# Patient Record
Sex: MALE | Race: WHITE | RURAL
[De-identification: names, ages, dates, MRNs, and addresses within clinical notes are randomized per-mention and may not be internally consistent; named-entity substitution may affect disease eponyms.]

---

## 2017-01-03 ENCOUNTER — CLINICAL SUPPORT (OUTPATIENT)
Dept: FAMILY MEDICINE CLINIC | Age: 82
End: 2017-01-03

## 2017-01-03 DIAGNOSIS — I48.20 CHRONIC ATRIAL FIBRILLATION (HCC): Primary | ICD-10-CM

## 2017-01-03 LAB
INR BLD: 3.5
PT POC: 42.4 SEC
VALID INTERNAL CONTROL?: YES

## 2017-01-05 ENCOUNTER — TELEPHONE (OUTPATIENT)
Dept: FAMILY MEDICINE CLINIC | Age: 82
End: 2017-01-05

## 2017-01-05 NOTE — TELEPHONE ENCOUNTER
Call pt, decreasing Coumadin to a therapeutic dose should not affect vision  If there are vision changes go to see an Ophthalmologist

## 2017-01-18 ENCOUNTER — CLINICAL SUPPORT (OUTPATIENT)
Dept: FAMILY MEDICINE CLINIC | Age: 82
End: 2017-01-18

## 2017-01-18 ENCOUNTER — TELEPHONE (OUTPATIENT)
Dept: FAMILY MEDICINE CLINIC | Age: 82
End: 2017-01-18

## 2017-01-18 DIAGNOSIS — I48.91 ATRIAL FIBRILLATION, UNSPECIFIED TYPE (HCC): Primary | ICD-10-CM

## 2017-01-18 LAB
INR BLD: 3.2
PT POC: 38.3 SEC
VALID INTERNAL CONTROL?: YES

## 2017-01-18 NOTE — TELEPHONE ENCOUNTER
Per verbal order from Dr. Rohan Alicia:   Warfarin  Hold medication on Thursday  Take 5 mg, 5 mg then 7.5 mg then repeat in the same sequence and come back in 2 weeks for recheck. The above information was given to patient.   Patient to come back on Wednesday, February 1, 2017  Inocencia Recinos LPN

## 2017-02-01 ENCOUNTER — CLINICAL SUPPORT (OUTPATIENT)
Dept: FAMILY MEDICINE CLINIC | Age: 82
End: 2017-02-01

## 2017-02-01 DIAGNOSIS — I48.20 CHRONIC ATRIAL FIBRILLATION (HCC): Primary | ICD-10-CM

## 2017-02-01 LAB
INR BLD: 2.2
PT POC: 26.1 SEC
VALID INTERNAL CONTROL?: YES

## 2017-02-01 NOTE — PROGRESS NOTES
Patient in office for PT/INR only. See flow sheet. Patient advised of recommendations for dosage (5 mg for 2 day and 7.5 mg for one day) and RTC in 2 weeks for recheck.     Dexter Price RN

## 2017-02-02 RX ORDER — ATENOLOL 25 MG/1
TABLET ORAL
Qty: 90 TAB | Refills: 0 | OUTPATIENT
Start: 2017-02-02

## 2017-02-09 ENCOUNTER — OFFICE VISIT (OUTPATIENT)
Dept: FAMILY MEDICINE CLINIC | Age: 82
End: 2017-02-09

## 2017-02-09 VITALS
RESPIRATION RATE: 16 BRPM | BODY MASS INDEX: 26.08 KG/M2 | SYSTOLIC BLOOD PRESSURE: 118 MMHG | HEART RATE: 72 BPM | WEIGHT: 203.2 LBS | TEMPERATURE: 96.5 F | DIASTOLIC BLOOD PRESSURE: 60 MMHG | HEIGHT: 74 IN | OXYGEN SATURATION: 99 %

## 2017-02-09 DIAGNOSIS — Z23 ENCOUNTER FOR IMMUNIZATION: ICD-10-CM

## 2017-02-09 DIAGNOSIS — J44.9 CHRONIC OBSTRUCTIVE PULMONARY DISEASE, UNSPECIFIED COPD TYPE (HCC): ICD-10-CM

## 2017-02-09 DIAGNOSIS — Z00.00 MEDICARE ANNUAL WELLNESS VISIT, INITIAL: Primary | ICD-10-CM

## 2017-02-09 DIAGNOSIS — I48.20 CHRONIC ATRIAL FIBRILLATION (HCC): ICD-10-CM

## 2017-02-09 DIAGNOSIS — Z71.89 ADVANCED DIRECTIVES, COUNSELING/DISCUSSION: ICD-10-CM

## 2017-02-09 DIAGNOSIS — Z13.31 SCREENING FOR DEPRESSION: ICD-10-CM

## 2017-02-09 DIAGNOSIS — I10 ESSENTIAL HYPERTENSION: ICD-10-CM

## 2017-02-09 DIAGNOSIS — E78.00 HYPERCHOLESTEROLEMIA: ICD-10-CM

## 2017-02-09 RX ORDER — ATENOLOL 25 MG/1
TABLET ORAL
Qty: 90 TAB | Refills: 1 | Status: SHIPPED | OUTPATIENT
Start: 2017-02-09 | End: 2017-07-10 | Stop reason: SDUPTHER

## 2017-02-09 NOTE — PATIENT INSTRUCTIONS
Schedule of Personalized Health Plan  (Provide Copy to Patient)  The best way to stay healthy is to live a healthy lifestyle. A healthy lifestyle includes regular exercise, eating a well-balanced diet, keeping a healthy weight and not smoking. Regular physical exams and screening tests are another important way to take care of yourself. Preventive exams provided by health care providers can find health problems early when treatment works best and can keep you from getting certain diseases or illnesses. Preventive services include exams, lab tests, screenings, shots, monitoring and information to help you take care of your own health. All people over 65 should have a pneumonia shot. Pneumonia shots are usually only needed once in a lifetime unless your doctor decides differently. All people over 65 should have a yearly flu shot. People over 65 are at medium to high risk for Hepatitis B. Three shots are needed for complete protection. In addition to your physical exam, some screening tests are recommended:    Bone mass measurement (dexa scan) is recommended every two years if you have certain risk factors, such as personal history of vertebral fracture or chronic steroid medication use    Diabetes Mellitus screening is recommended every year. Glaucoma is an eye disease caused by high pressure in the eye. An eye exam is recommended every year. Cardiovascular screening tests that check your cholesterol and other blood fat (lipid) levels are recommended every five years. Colorectal Cancer screening tests help to find pre-cancerous polyps (growths in the colon) so they can be removed before they turn into cancer. Tests ordered for screening depend on your personal and family history risk factors.     Screening for Prostate Cancer is recommended yearly with a digital rectal exam and/or a PSA test    Here is a list of your current Health Maintenance items with a due date:  Health Maintenance Topic Date Due    DTaP/Tdap/Td series (1 - Tdap) 02/01/1956    ZOSTER VACCINE AGE 60>  02/01/1995    GLAUCOMA SCREENING Q2Y  02/01/2000    MEDICARE YEARLY EXAM  02/01/2000    Pneumococcal 65+ Low/Medium Risk (2 of 2 - PCV13) 10/05/2016    INFLUENZA AGE 9 TO ADULT  Completed

## 2017-02-09 NOTE — PROGRESS NOTES
Selwyn Morrison is a 80 y.o. male and presents for annual Medicare Wellness Visit. Problem List: Reviewed with patient and discussed risk factors. Patient Active Problem List   Diagnosis Code    Hypercholesterolemia E78.00    Hypertension I10    COPD (chronic obstructive pulmonary disease) ([facility] J44.9    Edema R60.9    Atrial fibrillation (HCC) I48.91    Chronic anticoagulation Z79.01    Advanced directives, counseling/discussion Z71.89       Current medical providers:  Patient Care Team:  Dl Radford MD as PCP - General (Family Practice)    PSH: Reviewed with patient  Past Surgical History   Procedure Laterality Date    Hx prostatectomy  2008        SH: Reviewed with patient  Social History   Substance Use Topics    Smoking status: Former Smoker     Types: Cigarettes     Quit date: 9/24/1990    Smokeless tobacco: Never Used    Alcohol use No       FH: Reviewed with patient  Family History   Problem Relation Age of Onset    Heart Disease Father     Alcohol abuse Sister        Medications/Allergies: Reviewed with patient  Current Outpatient Prescriptions on File Prior to Visit   Medication Sig Dispense Refill    atenolol (TENORMIN) 25 mg tablet TAKE 1 TABLET BY MOUTH EVERY DAY 90 Tab 0    warfarin (COUMADIN) 5 mg tablet take 1 1/2 tablets by mouth once daily or as directed 135 Tab 0    prednisoLONE sodium phosphate (INFLAMASE FORTE) 1 % ophthalmic solution   0    HYDROcodone-acetaminophen (NORCO) 5-325 mg per tablet Take 1 Tab by mouth every eight (8) hours as needed for Pain. Max Daily Amount: 3 Tabs. 15 Tab 0    furosemide (LASIX) 20 mg tablet Take 1 Tab by mouth daily. 90 Tab 3    OXYGEN-AIR DELIVERY SYSTEMS 2 L by Nasal route. 2 liters o2       No current facility-administered medications on file prior to visit.        Allergies   Allergen Reactions    Niaspan [Niacin] Other (comments)    Statins-Hmg-Coa Reductase Inhibitors Other (comments)     Body aches    Sulfa (Sulfonamide Antibiotics) Unknown (comments)       Objective: There were no vitals taken for this visit. There is no height or weight on file to calculate BMI. Assessment of cognitive impairment: Alert and oriented x 3    Depression Screen:   PHQ 2 / 9, over the last two weeks 2/9/2017   Little interest or pleasure in doing things Several days   Feeling down, depressed or hopeless -   Total Score PHQ 2 -       Fall Risk Assessment:    Fall Risk Assessment, last 12 mths 2/9/2017   Able to walk? Yes   Fall in past 12 months? No       Functional Ability:   Does the patient exhibit a steady gait?  no   How long did it take the patient to get up and walk from a sitting position? 3 seconds   Is the patient self reliant?  (ie can do own laundry, meals, household chores)  yes     Does the patient handle his/her own medications? yes     Does the patient handle his/her own money? yes     Is the patients home safe (ie good lighting, handrails on stairs and bath, etc.)? yes     Did you notice or did patient express any hearing difficulties? no     Did you notice or did patient express any vision difficulties? yes     Were distance and reading eye charts used? no       Advance Care Planning:   Patient was offered the opportunity to discuss advance care planning:  yes   Does patient have an Advance Directive:  yes   If no, did you provide information on Caring Connections? yes       Plan:      No orders of the defined types were placed in this encounter. Health Maintenance   Topic Date Due    DTaP/Tdap/Td series (1 - Tdap) 02/01/1956    ZOSTER VACCINE AGE 60>  02/01/1995    GLAUCOMA SCREENING Q2Y  02/01/2000    MEDICARE YEARLY EXAM  02/01/2000    Pneumococcal 65+ Low/Medium Risk (2 of 2 - PCV13) 10/05/2016    INFLUENZA AGE 9 TO ADULT  Completed       *Patient verbalized understanding and agreement with the plan.   A copy of the After Visit Summary with personalized health plan was given to the patient today.

## 2017-02-09 NOTE — PROGRESS NOTES
HISTORY OF PRESENT ILLNESS  Soy Velazco is a 80 y.o. male. Chief Complaint   Patient presents with    Medication Refill    Annual Wellness Visit            HPI  HTN  On Atenolol for Afib  And on fluid pill  Needs refill of Atenolol  No SE with meds    On Coumadin  Last INR good    Hyperlipidemia  Not fasting today  Not on meds    COPD  Has oxygen  Doing ok  Not smoking      Review of Systems   Constitutional: Positive for weight loss. Negative for chills and fever. Respiratory: Positive for shortness of breath. Negative for cough and wheezing. Cardiovascular: Positive for leg swelling. Negative for chest pain. Gastrointestinal: Negative for blood in stool. Genitourinary: Negative. Negative for hematuria. Past Medical History   Diagnosis Date    Atrial fibrillation (HCC)     Chronic airway obstruction, not elsewhere classified (HCC)     Chronic anticoagulation     Edema     Hypercholesterolemia     Hypertension     Prostate CA [facility])      Current Outpatient Prescriptions   Medication Sig Dispense Refill    atenolol (TENORMIN) 25 mg tablet TAKE 1 TABLET BY MOUTH EVERY DAY 90 Tab 1    warfarin (COUMADIN) 5 mg tablet take 1 1/2 tablets by mouth once daily or as directed 135 Tab 0    prednisoLONE sodium phosphate (INFLAMASE FORTE) 1 % ophthalmic solution   0    furosemide (LASIX) 20 mg tablet Take 1 Tab by mouth daily. 90 Tab 3    OXYGEN-AIR DELIVERY SYSTEMS 2 L by Nasal route. 2 liters o2      HYDROcodone-acetaminophen (NORCO) 5-325 mg per tablet Take 1 Tab by mouth every eight (8) hours as needed for Pain. Max Daily Amount: 3 Tabs.  15 Tab 0     Allergies   Allergen Reactions    Niaspan [Niacin] Other (comments)    Statins-Hmg-Coa Reductase Inhibitors Other (comments)     Body aches    Sulfa (Sulfonamide Antibiotics) Unknown (comments)     Visit Vitals    /60 (BP 1 Location: Right arm, BP Patient Position: Sitting)    Pulse 72    Temp 96.5 °F (35.8 °C) (Oral)    Resp 16    Ht 6' 2\" (1.88 m)    Wt 203 lb 3.2 oz (92.2 kg)    SpO2 99%    BMI 26.09 kg/m2       Physical Exam   Constitutional: He is oriented to person, place, and time. He appears well-developed and well-nourished. No distress. HENT:   Head: Normocephalic and atraumatic. Right Ear: External ear normal.   Left Ear: External ear normal.   Mouth/Throat: Oropharynx is clear and moist. No oropharyngeal exudate. Eyes: Conjunctivae and EOM are normal. Pupils are equal, round, and reactive to light. Cardiovascular: Normal rate, regular rhythm and normal heart sounds. Pulmonary/Chest: Effort normal. He has no wheezes. Decreased BS   Musculoskeletal: He exhibits no edema. Lymphadenopathy:     He has no cervical adenopathy. Neurological: He is alert and oriented to person, place, and time. Skin: Skin is warm and dry. Psychiatric: He has a normal mood and affect. Nursing note and vitals reviewed. ASSESSMENT and PLAN    ICD-10-CM ICD-9-CM    1. Medicare annual wellness visit, initial Z00.00 V70.0    2. Advanced directives, counseling/discussion Z71.89 V65.49    3. Screening for depression Z13.89 V79.0 NE DEPRESSION SCREEN ANNUAL   4. Encounter for immunization Z23 V03.89 PNEUMOCOCCAL CONJ VACCINE 13 VALENT IM   5. Chronic atrial fibrillation (HCC) I48.2 427.31    6. Essential hypertension I10 401.9 CBC WITH AUTOMATED DIFF      METABOLIC PANEL, COMPREHENSIVE      atenolol (TENORMIN) 25 mg tablet   7. Hypercholesterolemia E78.00 272.0 LIPID PANEL WITH LDL/HDL RATIO   8.  Chronic obstructive pulmonary disease, unspecified COPD type ([facility] J44.9 496

## 2017-02-23 ENCOUNTER — CLINICAL SUPPORT (OUTPATIENT)
Dept: FAMILY MEDICINE CLINIC | Age: 82
End: 2017-02-23

## 2017-02-23 DIAGNOSIS — I48.20 CHRONIC ATRIAL FIBRILLATION (HCC): Primary | ICD-10-CM

## 2017-02-23 LAB
INR BLD: 2.3
PT POC: 27.4 SEC
VALID INTERNAL CONTROL?: YES

## 2017-02-28 DIAGNOSIS — R60.9 EDEMA, UNSPECIFIED TYPE: ICD-10-CM

## 2017-02-28 RX ORDER — FUROSEMIDE 20 MG/1
20 TABLET ORAL DAILY
Qty: 90 TAB | Refills: 3 | OUTPATIENT
Start: 2017-02-28

## 2017-02-28 RX ORDER — WARFARIN SODIUM 5 MG/1
TABLET ORAL
Qty: 135 TAB | Refills: 0 | OUTPATIENT
Start: 2017-02-28

## 2017-03-06 RX ORDER — WARFARIN SODIUM 5 MG/1
TABLET ORAL
Qty: 135 TAB | Refills: 0 | Status: SHIPPED | OUTPATIENT
Start: 2017-03-06 | End: 2017-07-10 | Stop reason: SDUPTHER

## 2017-03-06 NOTE — TELEPHONE ENCOUNTER
Requested Prescriptions     Pending Prescriptions Disp Refills    warfarin (COUMADIN) 5 mg tablet 135 Tab 0     Sig: take 1 1/2 tablets by mouth once daily or as directed       Last filled:       11/4/16      #135 tab        0 refills

## 2017-03-06 NOTE — TELEPHONE ENCOUNTER
Called and spoke to patient, he advises that he has an appointment for Thursday to see Dr Tricia Calzada.   Boyd Sampson RN

## 2017-03-09 ENCOUNTER — OFFICE VISIT (OUTPATIENT)
Dept: FAMILY MEDICINE CLINIC | Age: 82
End: 2017-03-09

## 2017-03-09 VITALS
SYSTOLIC BLOOD PRESSURE: 122 MMHG | WEIGHT: 205 LBS | HEIGHT: 76 IN | OXYGEN SATURATION: 96 % | RESPIRATION RATE: 20 BRPM | BODY MASS INDEX: 24.96 KG/M2 | HEART RATE: 64 BPM | TEMPERATURE: 96.7 F | DIASTOLIC BLOOD PRESSURE: 70 MMHG

## 2017-03-09 DIAGNOSIS — I48.91 ATRIAL FIBRILLATION, UNSPECIFIED TYPE (HCC): Primary | ICD-10-CM

## 2017-03-09 DIAGNOSIS — D64.9 ANEMIA, UNSPECIFIED TYPE: ICD-10-CM

## 2017-03-09 DIAGNOSIS — E78.00 HYPERCHOLESTEROLEMIA: ICD-10-CM

## 2017-03-09 DIAGNOSIS — I10 ESSENTIAL HYPERTENSION: ICD-10-CM

## 2017-03-09 LAB
INR BLD: 2.4
PT POC: 29 SEC
VALID INTERNAL CONTROL?: YES

## 2017-03-09 NOTE — PROGRESS NOTES
Per written/verbal order from , patient is to continue current coumadin dose and recheck in 2 weeks. Patient notified by phone.

## 2017-03-09 NOTE — MR AVS SNAPSHOT
Visit Information Date & Time Provider Department Dept. Phone Encounter #  
 3/9/2017  9:40 AM Isabela Morales MD [address] [place] [address] [phone] [id number] Follow-up Instructions Return in about 6 months (around 9/9/2017). Follow-up and Disposition History Upcoming Health Maintenance Date Due DTaP/Tdap/Td series (1 - Tdap) 2/1/1956 ZOSTER VACCINE AGE 60> 2/1/1995 GLAUCOMA SCREENING Q2Y 2/1/2000 MEDICARE YEARLY EXAM 2/10/2018 Allergies as of 3/9/2017  Review Complete On: 3/9/2017 By: Orlando Mo LPN Severity Noted Reaction Type Reactions Niaspan [Niacin]  12/03/2013    Other (comments) Statins-hmg-coa Reductase Inhibitors  09/24/2013   Side Effect Other (comments) Body aches Sulfa (Sulfonamide Antibiotics)  09/24/2013    Unknown (comments) Current Immunizations  Reviewed on 2/9/2017 Name Date Influenza High Dose Vaccine PF 10/19/2016, 10/5/2015 Influenza Vaccine 9/24/2014 Pneumococcal Conjugate (PCV-13) 2/9/2017 Pneumococcal Polysaccharide (PPSV-23) 10/5/2015, 9/19/2014 Not reviewed this visit You Were Diagnosed With   
  
 Codes Comments Atrial fibrillation, unspecified type ([facility]    -  Primary ICD-10-CM: I48.91 
ICD-9-CM: 427.31 Hypercholesterolemia     ICD-10-CM: E78.00 ICD-9-CM: 272.0 Essential hypertension     ICD-10-CM: I10 
ICD-9-CM: 401.9 Anemia, unspecified type     ICD-10-CM: D64.9 ICD-9-CM: 534. 9 Vitals BP Pulse Temp Resp Height(growth percentile) Weight(growth percentile) 122/70 (BP 1 Location: Left arm, BP Patient Position: Sitting) 64 96.7 °F (35.9 °C) (Oral) 20 6' 4\" (1.93 m) 205 lb (93 kg) SpO2 BMI Smoking Status 96% 24.95 kg/m2 Former Smoker Vitals History BMI and BSA Data Body Mass Index Body Surface Area 24.95 kg/m 2 2.23 m 2 Preferred Pharmacy Pharmacy Name Phone [address] [place] [address] Your Updated Medication List  
  
   
This list is accurate as of: 3/9/17 11:23 AM.  Always use your most recent med list.  
  
  
  
  
 atenolol 25 mg tablet Commonly known as:  TENORMIN  
TAKE 1 TABLET BY MOUTH EVERY DAY  
  
 furosemide 20 mg tablet Commonly known as:  LASIX Take 1 Tab by mouth daily. OXYGEN-AIR DELIVERY SYSTEMS  
2 L by Nasal route. 2 liters o2  
  
 prednisoLONE sodium phosphate 1 % ophthalmic solution Commonly known as:  INFLAMASE FORTE  
  
 warfarin 5 mg tablet Commonly known as:  COUMADIN  
take 1 1/2 tablets by mouth once daily or as directed We Performed the Following AMB POC PT/INR [00901 CPT(R)] CBC WITH AUTOMATED DIFF [49699 CPT(R)] LIPID PANEL WITH LDL/HDL RATIO [59830 CPT(R)] METABOLIC PANEL, COMPREHENSIVE [58082 CPT(R)] Follow-up Instructions Return in about 6 months (around 9/9/2017). Please provide this summary of care documentation to your next provider. Your primary care clinician is listed as Rhiannon Pearson. If you have any questions after today's visit, please call [phone].

## 2017-03-09 NOTE — PROGRESS NOTES
HISTORY OF PRESENT ILLNESS  Lauren Teague is a 80 y.o. male. Chief Complaint   Patient presents with    Medication Refill     Warfarin    Coagulation disorder     Pt/Inr       HPI    Review of Systems   Constitutional: Negative for fever and malaise/fatigue. HENT: Negative for congestion. Eyes: Negative for blurred vision. Respiratory: Negative for cough. Cardiovascular: Negative for chest pain. Gastrointestinal: Negative for blood in stool and melena. Genitourinary: Positive for frequency. Negative for dysuria. Musculoskeletal: Positive for back pain. Endo/Heme/Allergies: Negative for polydipsia. Does not bruise/bleed easily. Past Medical History:   Diagnosis Date    Atrial fibrillation ([facility]     Chronic airway obstruction, not elsewhere classified (HCC)     Chronic anticoagulation     Edema     Hypercholesterolemia     Hypertension     Prostate [facility])      Current Outpatient Prescriptions   Medication Sig Dispense Refill    warfarin (COUMADIN) 5 mg tablet take 1 1/2 tablets by mouth once daily or as directed 135 Tab 0    atenolol (TENORMIN) 25 mg tablet TAKE 1 TABLET BY MOUTH EVERY DAY 90 Tab 1    prednisoLONE sodium phosphate (INFLAMASE FORTE) 1 % ophthalmic solution   0    furosemide (LASIX) 20 mg tablet Take 1 Tab by mouth daily. 90 Tab 3    OXYGEN-AIR DELIVERY SYSTEMS 2 L by Nasal route. 2 liters o2       Allergies   Allergen Reactions    Niaspan [Niacin] Other (comments)    Statins-Hmg-Coa Reductase Inhibitors Other (comments)     Body aches    Sulfa (Sulfonamide Antibiotics) Unknown (comments)     Visit Vitals    /70 (BP 1 Location: Left arm, BP Patient Position: Sitting)    Pulse 64    Temp 96.7 °F (35.9 °C) (Oral)    Resp 20    Ht 6' 4\" (1.93 m)    Wt 205 lb (93 kg)    SpO2 96%    BMI 24.95 kg/m2       Physical Exam   Constitutional: He is oriented to person, place, and time. He appears well-developed and well-nourished. No distress.    HENT: Head: Normocephalic and atraumatic. Eyes: Conjunctivae and EOM are normal.   Cardiovascular: Normal rate, regular rhythm and normal heart sounds. Pulmonary/Chest: Effort normal and breath sounds normal.   Musculoskeletal: He exhibits no edema. Neurological: He is alert and oriented to person, place, and time. Skin: Skin is warm and dry. Nursing note and vitals reviewed. Recent Results (from the past 12 hour(s))   AMB POC PT/INR    Collection Time: 03/09/17  9:59 AM   Result Value Ref Range    VALID INTERNAL CONTROL POC Yes     Prothrombin time (POC) 29.0 sec    INR POC 2.4        ASSESSMENT and PLAN    ICD-10-CM ICD-9-CM    1. Atrial fibrillation, unspecified type (HCC) I48.91 427.31 AMB POC PT/INR   2. Hypercholesterolemia E78.00 272.0 LIPID PANEL WITH LDL/HDL RATIO   3. Essential hypertension F79 059.5 METABOLIC PANEL, COMPREHENSIVE   4.  Anemia, unspecified type D64.9 285.9 CBC WITH AUTOMATED DIFF   cont current Coumadin dose, recheck in 2w

## 2017-03-09 NOTE — PROGRESS NOTES
Chief Complaint   Patient presents with    Medication Refill     Warfarin    Coagulation disorder     Pt/Inr     Morning meds taken this Corona Steele LPN

## 2017-03-10 DIAGNOSIS — E78.00 HYPERCHOLESTEROLEMIA: Primary | ICD-10-CM

## 2017-03-10 LAB
ALBUMIN SERPL-MCNC: 4 G/DL (ref 3.5–4.7)
ALBUMIN/GLOB SERPL: 1.5 {RATIO} (ref 1.1–2.5)
ALP SERPL-CCNC: 108 IU/L (ref 39–117)
ALT SERPL-CCNC: 16 IU/L (ref 0–44)
AST SERPL-CCNC: 21 IU/L (ref 0–40)
BASOPHILS # BLD AUTO: 0 X10E3/UL (ref 0–0.2)
BASOPHILS NFR BLD AUTO: 1 %
BILIRUB SERPL-MCNC: 0.4 MG/DL (ref 0–1.2)
BUN SERPL-MCNC: 17 MG/DL (ref 8–27)
BUN/CREAT SERPL: 20 (ref 10–22)
CALCIUM SERPL-MCNC: 9.2 MG/DL (ref 8.6–10.2)
CHLORIDE SERPL-SCNC: 100 MMOL/L (ref 96–106)
CHOLEST SERPL-MCNC: 266 MG/DL (ref 100–199)
CO2 SERPL-SCNC: 26 MMOL/L (ref 18–29)
CREAT SERPL-MCNC: 0.87 MG/DL (ref 0.76–1.27)
EOSINOPHIL # BLD AUTO: 0.2 X10E3/UL (ref 0–0.4)
EOSINOPHIL NFR BLD AUTO: 3 %
ERYTHROCYTE [DISTWIDTH] IN BLOOD BY AUTOMATED COUNT: 14.5 % (ref 12.3–15.4)
GLOBULIN SER CALC-MCNC: 2.7 G/DL (ref 1.5–4.5)
GLUCOSE SERPL-MCNC: 81 MG/DL (ref 65–99)
HCT VFR BLD AUTO: 38.2 % (ref 37.5–51)
HDLC SERPL-MCNC: 56 MG/DL
HGB BLD-MCNC: 12.9 G/DL (ref 12.6–17.7)
IMM GRANULOCYTES # BLD: 0 X10E3/UL (ref 0–0.1)
IMM GRANULOCYTES NFR BLD: 0 %
INTERPRETATION, 910389: NORMAL
LDLC SERPL CALC-MCNC: 171 MG/DL (ref 0–99)
LDLC/HDLC SERPL: 3.1 RATIO UNITS (ref 0–3.6)
LYMPHOCYTES # BLD AUTO: 1.2 X10E3/UL (ref 0.7–3.1)
LYMPHOCYTES NFR BLD AUTO: 22 %
MCH RBC QN AUTO: 30.6 PG (ref 26.6–33)
MCHC RBC AUTO-ENTMCNC: 33.8 G/DL (ref 31.5–35.7)
MCV RBC AUTO: 91 FL (ref 79–97)
MONOCYTES # BLD AUTO: 0.7 X10E3/UL (ref 0.1–0.9)
MONOCYTES NFR BLD AUTO: 12 %
NEUTROPHILS # BLD AUTO: 3.4 X10E3/UL (ref 1.4–7)
NEUTROPHILS NFR BLD AUTO: 62 %
PLATELET # BLD AUTO: 164 X10E3/UL (ref 150–379)
POTASSIUM SERPL-SCNC: 4.7 MMOL/L (ref 3.5–5.2)
PROT SERPL-MCNC: 6.7 G/DL (ref 6–8.5)
RBC # BLD AUTO: 4.22 X10E6/UL (ref 4.14–5.8)
SODIUM SERPL-SCNC: 142 MMOL/L (ref 134–144)
TRIGL SERPL-MCNC: 195 MG/DL (ref 0–149)
VLDLC SERPL CALC-MCNC: 39 MG/DL (ref 5–40)
WBC # BLD AUTO: 5.4 X10E3/UL (ref 3.4–10.8)

## 2017-03-10 RX ORDER — EZETIMIBE 10 MG/1
10 TABLET ORAL DAILY
Qty: 30 TAB | Refills: 3 | Status: SHIPPED | OUTPATIENT
Start: 2017-03-10 | End: 2017-07-10

## 2017-03-10 NOTE — PROGRESS NOTES
Call pt, the sugar, kidney and liver tests are normal  The Chol is too high, I am calling in a non statin medication to help bring it down called Zetia and recheck FASTING in 3 mo  The CBC is normal

## 2017-03-23 ENCOUNTER — CLINICAL SUPPORT (OUTPATIENT)
Dept: FAMILY MEDICINE CLINIC | Age: 82
End: 2017-03-23

## 2017-03-23 VITALS — DIASTOLIC BLOOD PRESSURE: 77 MMHG | SYSTOLIC BLOOD PRESSURE: 159 MMHG | HEART RATE: 74 BPM

## 2017-03-23 DIAGNOSIS — I48.20 CHRONIC ATRIAL FIBRILLATION (HCC): Primary | ICD-10-CM

## 2017-03-23 LAB
INR BLD: 2.6
PT POC: 31.5 SEC
VALID INTERNAL CONTROL?: YES

## 2017-05-15 ENCOUNTER — CLINICAL SUPPORT (OUTPATIENT)
Dept: FAMILY MEDICINE CLINIC | Age: 82
End: 2017-05-15

## 2017-05-15 VITALS — SYSTOLIC BLOOD PRESSURE: 118 MMHG | HEART RATE: 64 BPM | DIASTOLIC BLOOD PRESSURE: 70 MMHG

## 2017-05-15 DIAGNOSIS — Z79.01 CHRONIC ANTICOAGULATION: ICD-10-CM

## 2017-05-15 DIAGNOSIS — I48.20 CHRONIC ATRIAL FIBRILLATION (HCC): Primary | ICD-10-CM

## 2017-05-15 LAB
INR BLD: 2.4
PT POC: 29.3 SEC
VALID INTERNAL CONTROL?: YES

## 2017-05-15 NOTE — PROGRESS NOTES
Coumadin Flowsheet Values Recorded Today: Date: 05/15/17  DX:  Atrial Fibrillation  Dosage: 5mg x 2 days alternaate with 7.5mg  PROTIME results: 29.3  INR results: 2.4  Denies missing dose or extra dose?: Yes  Bleeding or unusual bruising?: No     Vitals:    05/15/17 1147   BP: 118/70   Pulse: 64

## 2017-07-10 ENCOUNTER — OFFICE VISIT (OUTPATIENT)
Dept: FAMILY MEDICINE CLINIC | Age: 82
End: 2017-07-10

## 2017-07-10 VITALS
WEIGHT: 204 LBS | SYSTOLIC BLOOD PRESSURE: 127 MMHG | HEIGHT: 76 IN | BODY MASS INDEX: 24.84 KG/M2 | DIASTOLIC BLOOD PRESSURE: 68 MMHG | RESPIRATION RATE: 18 BRPM | TEMPERATURE: 97 F | HEART RATE: 67 BPM | OXYGEN SATURATION: 98 %

## 2017-07-10 DIAGNOSIS — I10 ESSENTIAL HYPERTENSION: ICD-10-CM

## 2017-07-10 DIAGNOSIS — R60.9 EDEMA, UNSPECIFIED TYPE: ICD-10-CM

## 2017-07-10 DIAGNOSIS — I48.20 CHRONIC ATRIAL FIBRILLATION (HCC): Primary | ICD-10-CM

## 2017-07-10 DIAGNOSIS — E78.00 HYPERCHOLESTEROLEMIA: ICD-10-CM

## 2017-07-10 LAB
INR BLD: 2.3
PT POC: 27.3 SECONDS
VALID INTERNAL CONTROL?: YES

## 2017-07-10 RX ORDER — ATENOLOL 25 MG/1
TABLET ORAL
Qty: 90 TAB | Refills: 3 | Status: SHIPPED | OUTPATIENT
Start: 2017-07-10 | End: 2018-05-07 | Stop reason: SDUPTHER

## 2017-07-10 RX ORDER — WARFARIN SODIUM 5 MG/1
TABLET ORAL
Qty: 135 TAB | Refills: 0 | Status: SHIPPED | OUTPATIENT
Start: 2017-07-10 | End: 2017-11-01 | Stop reason: SDUPTHER

## 2017-07-10 RX ORDER — FUROSEMIDE 20 MG/1
20 TABLET ORAL DAILY
Qty: 90 TAB | Refills: 3 | Status: SHIPPED | OUTPATIENT
Start: 2017-07-10 | End: 2018-05-07 | Stop reason: SDUPTHER

## 2017-07-10 NOTE — PROGRESS NOTES
Pt in for PT INR, also- is on 7.5mg Mon, 5 mg Tu and Wed, 7.5mg Th, 5 mg Fr and Sat, 7.5 mg [place].

## 2017-07-10 NOTE — PROGRESS NOTES
HISTORY OF PRESENT ILLNESS  Queen Luis is a 80 y.o. male. Chief Complaint   Patient presents with    Medication Refill       HPI  Hyperlipidemia  Not taking Zetia  Never tried it  Does not want to try it  Not watching diet    HTN  Needs refills      Afib  Here for INR  on Coumadin  Taking 7.5 mg then 5mg for 2 days, then 7.5 mg and again 5mg for 2 d      Review of Systems   Constitutional: Negative for weight loss. Respiratory: Positive for shortness of breath (comes and goes, d/t COPD). Negative for cough. Cardiovascular: Positive for leg swelling. Negative for chest pain. Neurological: Positive for sensory change (in feet). Past Medical History:   Diagnosis Date    Atrial fibrillation (HCC)     Chronic airway obstruction, not elsewhere classified     Chronic anticoagulation     Edema     Hypercholesterolemia     Hypertension     Prostate CA [facility])      Current Outpatient Prescriptions   Medication Sig Dispense Refill    warfarin (COUMADIN) 5 mg tablet take 1 1/2 tablets by mouth once daily or as directed 135 Tab 0    atenolol (TENORMIN) 25 mg tablet TAKE 1 TABLET BY MOUTH EVERY DAY 90 Tab 3    furosemide (LASIX) 20 mg tablet Take 1 Tab by mouth daily. 90 Tab 3    prednisoLONE sodium phosphate (INFLAMASE FORTE) 1 % ophthalmic solution   0    OXYGEN-AIR DELIVERY SYSTEMS 2 L by Nasal route. 2 liters o2       Allergies   Allergen Reactions    Niaspan [Niacin] Other (comments)    Statins-Hmg-Coa Reductase Inhibitors Other (comments)     Body aches    Sulfa (Sulfonamide Antibiotics) Unknown (comments)     Visit Vitals    /68    Pulse 67    Temp 97 °F (36.1 °C) (Temporal)    Resp 18    Ht 6' 4\" (1.93 m)    Wt 204 lb (92.5 kg)    SpO2 98%    BMI 24.83 kg/m2     Physical Exam   Constitutional: He is oriented to person, place, and time. He appears well-developed and well-nourished. No distress. HENT:   Head: Normocephalic and atraumatic.    Mouth/Throat: Oropharynx is clear and moist. No oropharyngeal exudate. Eyes: Conjunctivae and EOM are normal. Pupils are equal, round, and reactive to light. Cardiovascular: Normal rate and regular rhythm. Pulmonary/Chest: Effort normal and breath sounds normal.   Musculoskeletal: He exhibits no edema. Lymphadenopathy:     He has no cervical adenopathy. Neurological: He is alert and oriented to person, place, and time. Skin: Skin is warm and dry. Psychiatric: He has a normal mood and affect. Nursing note and vitals reviewed. Recent Results (from the past 12 hour(s))   AMB POC PT/INR    Collection Time: 07/10/17  9:47 AM   Result Value Ref Range    VALID INTERNAL CONTROL POC Yes     Prothrombin time (POC) 27.3 seconds    INR POC 2.3        ASSESSMENT and PLAN    ICD-10-CM ICD-9-CM    1. Chronic atrial fibrillation (HCC) I48.2 427.31 AMB POC PT/INR      COLLECTION CAPILLARY BLOOD SPECIMEN   2. Hypercholesterolemia E78.00 272.0    3. Essential hypertension I10 401.9 atenolol (TENORMIN) 25 mg tablet   4.  Edema, unspecified type R60.9 782.3 furosemide (LASIX) 20 mg tablet   risk and benefits discussed with pt, but does not want to take Chol meds

## 2017-07-25 ENCOUNTER — CLINICAL SUPPORT (OUTPATIENT)
Dept: FAMILY MEDICINE CLINIC | Age: 82
End: 2017-07-25

## 2017-07-25 DIAGNOSIS — Z79.01 CHRONIC ANTICOAGULATION: Primary | ICD-10-CM

## 2017-07-25 LAB
INR BLD: 1.8
PT POC: 22.1 SECONDS
VALID INTERNAL CONTROL?: YES

## 2017-08-17 ENCOUNTER — OFFICE VISIT (OUTPATIENT)
Dept: FAMILY MEDICINE CLINIC | Age: 82
End: 2017-08-17

## 2017-08-17 ENCOUNTER — TELEPHONE (OUTPATIENT)
Dept: FAMILY MEDICINE CLINIC | Age: 82
End: 2017-08-17

## 2017-08-17 VITALS
BODY MASS INDEX: 24.52 KG/M2 | RESPIRATION RATE: 18 BRPM | HEART RATE: 86 BPM | TEMPERATURE: 97.2 F | HEIGHT: 76 IN | SYSTOLIC BLOOD PRESSURE: 126 MMHG | WEIGHT: 201.4 LBS | OXYGEN SATURATION: 98 % | DIASTOLIC BLOOD PRESSURE: 60 MMHG

## 2017-08-17 DIAGNOSIS — K62.5 RECTAL BLEEDING: ICD-10-CM

## 2017-08-17 DIAGNOSIS — I48.20 CHRONIC ATRIAL FIBRILLATION (HCC): Primary | ICD-10-CM

## 2017-08-17 DIAGNOSIS — K52.9 GASTROENTERITIS: ICD-10-CM

## 2017-08-17 LAB
INR BLD: 1.9
PT POC: 22.8 SECONDS
VALID INTERNAL CONTROL?: YES

## 2017-08-17 RX ORDER — CLOTRIMAZOLE AND BETAMETHASONE DIPROPIONATE 10; .64 MG/G; MG/G
CREAM TOPICAL
Qty: 15 G | Refills: 0 | Status: SHIPPED | OUTPATIENT
Start: 2017-08-17 | End: 2017-08-17 | Stop reason: CLARIF

## 2017-08-17 RX ORDER — HYDROCORTISONE 25 MG/G
CREAM TOPICAL 2 TIMES DAILY
Qty: 30 G | Refills: 1 | Status: SHIPPED | OUTPATIENT
Start: 2017-08-17 | End: 2018-01-29

## 2017-08-17 RX ORDER — HYDROCORTISONE ACETATE 25 MG/1
25 SUPPOSITORY RECTAL EVERY 12 HOURS
Qty: 14 SUPPOSITORY | Refills: 0 | Status: SHIPPED | OUTPATIENT
Start: 2017-08-17 | End: 2017-08-17 | Stop reason: CLARIF

## 2017-08-17 RX ORDER — CHLORPHENIRAMINE MALEATE 4 MG
TABLET ORAL 2 TIMES DAILY
Qty: 30 G | Refills: 0 | Status: SHIPPED | OUTPATIENT
Start: 2017-08-17 | End: 2018-01-29

## 2017-08-17 NOTE — PROGRESS NOTES
Patient with rectal bleeding  Was told to hold ×2 days and then restart Coumadin at a lower dose 5 mg daily  Recheck next week

## 2017-08-17 NOTE — PATIENT INSTRUCTIONS
Hold Coumadin tonight and Friday  Then restart at 5 mg daily  Anusol suppositories twice daily  Lotrisone cream to external rectal area  Follow-up 1 week sooner if bleeding persists

## 2017-08-17 NOTE — TELEPHONE ENCOUNTER
----- Message from Carmelo Gomez sent at 8/17/2017  8:50 AM EDT -----  Regarding: pt outreach  Contact: [phone]  Pt's daughter Dixie Lamb calls with concern that pt is having rectal bleeding, He has a history of Hemorids and takes coumadin. We made appointment with Jennifer Abbott today, But Mr. Franky Acosta refuses to come in. Could you please call pt to assess and try to get him to come in? I left his 3 pm appt on with Charla .  There is also a 1 pm slot with her that just opened up

## 2017-08-17 NOTE — PROGRESS NOTES
Sakina Hobson is a 80 y.o. male who presents to the office today with the following:  Chief Complaint   Patient presents with    Rectal Bleeding       Allergies   Allergen Reactions    Niaspan [Niacin] Other (comments)    Statins-Hmg-Coa Reductase Inhibitors Other (comments)     Body aches    Sulfa (Sulfonamide Antibiotics) Unknown (comments)       Current Outpatient Prescriptions   Medication Sig    warfarin (COUMADIN) 5 mg tablet take 1 1/2 tablets by mouth once daily or as directed    atenolol (TENORMIN) 25 mg tablet TAKE 1 TABLET BY MOUTH EVERY DAY    furosemide (LASIX) 20 mg tablet Take 1 Tab by mouth daily.  prednisoLONE sodium phosphate (INFLAMASE FORTE) 1 % ophthalmic solution     OXYGEN-AIR DELIVERY SYSTEMS 2 L by Nasal route. 2 liters o2     No current facility-administered medications for this visit. Past Medical History:   Diagnosis Date    Atrial fibrillation (HCC)     Chronic airway obstruction, not elsewhere classified     Chronic anticoagulation     Edema     Hypercholesterolemia     Hypertension     Prostate CA [facility])        Past Surgical History:   Procedure Laterality Date    HX PROSTATECTOMY  2008       History   Smoking Status    Former Smoker    Types: Cigarettes    Quit date: 9/24/1990   Smokeless Tobacco    Never Used       Family History   Problem Relation Age of Onset    Heart Disease Father     Alcohol abuse Sister          History of Present Illness:  Patient here for evaluation of rectal bleeding    Patient states he had a stomach bug last week with some malaise and vomiting. No diarrhea. Those symptoms have improved  Around the time he began to experience rectal bleeding. He said he would get bright red blood per rectum when he moved his bowels. The bleeding has persisted. He has a history of hemorrhoids. No history of other bowel problems. He has had no fever no chills no abdominal pain.   No other current GI complaints    Patient is on Coumadin for atrial fibrillation. 5 mg/5 mg/7.5 mg every 3 days. INRs have been in good control. He actually held his Coumadin for 2 days. INR today was 1.9 indicating his previous levels were probably a bit high. No other bleeding issues    Patient has never had colon screening colonoscopies etc.  We did discuss this today in light of his rectal bleeding. He declines. He is aware of the indications    He does have COPD on O2. Vital signs and O2 sat acceptable today        Review of Systems:      Review of systems negative except as noted above    Physical Exam:  Visit Vitals    /60 (BP 1 Location: Left arm, BP Patient Position: Sitting)    Pulse 86    Temp 97.2 °F (36.2 °C) (Oral)    Resp 18    Ht 6' 4\" (1.93 m)    Wt 201 lb 6.4 oz (91.4 kg)    SpO2 98%    BMI 24.52 kg/m2     Vitals:    08/17/17 1229   BP: 126/60   BP 1 Location: Left arm   BP Patient Position: Sitting   Pulse: 86   Resp: 18   Temp: 97.2 °F (36.2 °C)   TempSrc: Oral   SpO2: 98%   Weight: 201 lb 6.4 oz (91.4 kg)   Height: 6' 4\" (1.93 m)     Patient was in no acute distress vital signs stable. Pulse and blood pressure normal  Chest was clear  Sinus rhythm today. Normal rate. No S3 no S4 no murmurs  Abdomen no HSM no masses soft and was nontender throughout  External rectal exam revealed some irritated somewhat macerated skin in the perirectal area  Digital exam revealed no obvious hemorrhoids or masses. There was bright red blood on the glove  INR 1.8  Assessment/Plan:  1. Chronic atrial fibrillation (HCC)    - AMB POC PT/INR  - COLLECTION CAPILLARY BLOOD SPECIMEN    2. Rectal bleeding  Suspect secondary to perirectal irritation from recent gastroenteritis and anticoagulation. I am going to hold his Coumadin for 2 days then restart at 5 mg daily. I am checking a CBC today.   I am going to treat the perirectal irritation with Anusol HC suppositories and some Lotrisone cream as the external rectal area looked a little fungal in nature. He will be back in 1 week for follow-up. He is to follow-up sooner if he has any worsening of his rectal bleeding  - CBC WITH AUTOMATED DIFF  - hydrocortisone (ANUSOL-HC) 25 mg supp; Insert 1 Suppository into rectum every twelve (12) hours. Dispense: 14 Suppository; Refill: 0  - clotrimazole-betamethasone (LOTRISONE) topical cream; Twice daily to affected area  Dispense: 15 g; Refill: 0    3. Gastroenteritis      Addendum: Neither Anusol HC Suppository snore Lotrisone were covered. I wrote for Proctosol cream to be applied locally and rectally twice daily and clotrimazole cream topically twice daily. Sent to pharmacy  Patient Instructions   Hold Coumadin tonight and Friday  Then restart at 5 mg daily  Anusol suppositories twice daily  Lotrisone cream to external rectal area  Follow-up 1 week sooner if bleeding persists        Continue current therapy plan except for indicated above. Verbal and written instructions (see AVS) provided.  Patient expresses understanding of diagnosis and treatment plan. Follow-up Disposition:  Return in about 1 week (around 8/24/2017). Christiano Cody.  Jones Thompson MD

## 2017-08-17 NOTE — MR AVS SNAPSHOT
Visit Information Date & Time Provider Department Dept. Phone Encounter #  
 8/17/2017  1:00 PM Jarvis Anthony  [facility] [phone] [id number] Follow-up Instructions Return in about 1 week (around 8/24/2017). Your Appointments 2/13/2018 10:00 AM  
Medicare Physical with Gabby Campoverde MD  
175 [facility] ([address]) Appt Note: Medicare physical cp40 7/10/17 [facility]  
 [facility] [address] [address]  
[phone]  
  
   
 19 [facility] [address] Upcoming Health Maintenance Date Due  
 GLAUCOMA SCREENING Q2Y 2/1/2000 INFLUENZA AGE 9 TO ADULT 8/1/2017 MEDICARE YEARLY EXAM 2/10/2018 DTaP/Tdap/Td series (2 - Td) 7/10/2027 Allergies as of 8/17/2017  Review Complete On: 8/17/2017 By: Jarvis Anthony MD  
  
 Severity Noted Reaction Type Reactions Niaspan [Niacin]  12/03/2013    Other (comments) Statins-hmg-coa Reductase Inhibitors  09/24/2013   Side Effect Other (comments) Body aches Sulfa (Sulfonamide Antibiotics)  09/24/2013    Unknown (comments) Current Immunizations  Reviewed on 2/9/2017 Name Date Influenza High Dose Vaccine PF 10/19/2016, 10/5/2015 Influenza Vaccine 9/24/2014 Pneumococcal Conjugate (PCV-13) 2/9/2017 Pneumococcal Polysaccharide (PPSV-23) 10/5/2015, 9/19/2014 Not reviewed this visit You Were Diagnosed With   
  
 Codes Comments Chronic atrial fibrillation (HCC)    -  Primary ICD-10-CM: C84.3 ICD-9-CM: 427.31 Rectal bleeding     ICD-10-CM: K62.5 ICD-9-CM: 569.3 Gastroenteritis     ICD-10-CM: K52.9 ICD-9-CM: 558. 9 Vitals BP Pulse Temp Resp Height(growth percentile) Weight(growth percentile) 126/60 (BP 1 Location: Left arm, BP Patient Position: Sitting) 86 97.2 °F (36.2 °C) (Oral) 18 6' 4\" (1.93 m) 201 lb 6.4 oz (91.4 kg) SpO2 BMI Smoking Status 98% 24.52 kg/m2 Former Smoker Vitals History BMI and BSA Data Body Mass Index Body Surface Area 24.52 kg/m 2 2.21 m 2 Preferred Pharmacy Pharmacy Name Phone [address] Your Updated Medication List  
  
   
This list is accurate as of: 8/17/17  1:12 PM.  Always use your most recent med list.  
  
  
  
  
 atenolol 25 mg tablet Commonly known as:  TENORMIN  
TAKE 1 TABLET BY MOUTH EVERY DAY  
  
 clotrimazole-betamethasone topical cream  
Commonly known as:  Ori Aubree Twice daily to affected area  
  
 furosemide 20 mg tablet Commonly known as:  LASIX Take 1 Tab by mouth daily. hydrocortisone 25 mg Supp Commonly known as:  ANUSOL-HC Insert 1 Suppository into rectum every twelve (12) hours. OXYGEN-AIR DELIVERY SYSTEMS  
2 L by Nasal route. 2 liters o2  
  
 prednisoLONE sodium phosphate 1 % ophthalmic solution Commonly known as:  INFLAMASE FORTE  
  
 warfarin 5 mg tablet Commonly known as:  COUMADIN  
take 1 1/2 tablets by mouth once daily or as directed Prescriptions Sent to Pharmacy Refills  
 hydrocortisone (ANUSOL-HC) 25 mg supp 0 Sig: Insert 1 Suppository into rectum every twelve (12) hours. Class: Normal  
 [facility] Ph #: [phone] Route: Rectal  
 clotrimazole-betamethasone (LOTRISONE) topical cream 0 Sig: Twice daily to affected area Class: Normal  
 Pharmacy: [address] [place] [address] Ph #: [phone] We Performed the Following AMB POC PT/INR [72541 CPT(R)] CBC WITH AUTOMATED DIFF [88258 CPT(R)] COLLECTION CAPILLARY BLOOD SPECIMEN [11241 CPT(R)] Follow-up Instructions Return in about 1 week (around 8/24/2017). Patient Instructions Hold Coumadin tonight and Friday Then restart at 5 mg daily Anusol suppositories twice daily Lotrisone cream to external rectal area Follow-up 1 week sooner if bleeding persists Please provide this summary of care documentation to your next provider. Your primary care clinician is listed as Rhiannon Pearson. If you have any questions after today's visit, please call [phone].

## 2017-08-18 LAB
BASOPHILS # BLD AUTO: 0 X10E3/UL (ref 0–0.2)
BASOPHILS NFR BLD AUTO: 1 %
EOSINOPHIL # BLD AUTO: 0.1 X10E3/UL (ref 0–0.4)
EOSINOPHIL NFR BLD AUTO: 2 %
ERYTHROCYTE [DISTWIDTH] IN BLOOD BY AUTOMATED COUNT: 14.1 % (ref 12.3–15.4)
HCT VFR BLD AUTO: 37.7 % (ref 37.5–51)
HGB BLD-MCNC: 12.6 G/DL (ref 12.6–17.7)
IMM GRANULOCYTES # BLD: 0 X10E3/UL (ref 0–0.1)
IMM GRANULOCYTES NFR BLD: 0 %
LYMPHOCYTES # BLD AUTO: 1.4 X10E3/UL (ref 0.7–3.1)
LYMPHOCYTES NFR BLD AUTO: 21 %
MCH RBC QN AUTO: 30.4 PG (ref 26.6–33)
MCHC RBC AUTO-ENTMCNC: 33.4 G/DL (ref 31.5–35.7)
MCV RBC AUTO: 91 FL (ref 79–97)
MONOCYTES # BLD AUTO: 0.9 X10E3/UL (ref 0.1–0.9)
MONOCYTES NFR BLD AUTO: 14 %
NEUTROPHILS # BLD AUTO: 4.1 X10E3/UL (ref 1.4–7)
NEUTROPHILS NFR BLD AUTO: 62 %
PLATELET # BLD AUTO: 185 X10E3/UL (ref 150–379)
RBC # BLD AUTO: 4.14 X10E6/UL (ref 4.14–5.8)
WBC # BLD AUTO: 6.6 X10E3/UL (ref 3.4–10.8)

## 2017-08-23 ENCOUNTER — OFFICE VISIT (OUTPATIENT)
Dept: FAMILY MEDICINE CLINIC | Age: 82
End: 2017-08-23

## 2017-08-23 VITALS
OXYGEN SATURATION: 98 % | BODY MASS INDEX: 24.23 KG/M2 | WEIGHT: 199 LBS | DIASTOLIC BLOOD PRESSURE: 70 MMHG | RESPIRATION RATE: 16 BRPM | HEIGHT: 76 IN | HEART RATE: 68 BPM | SYSTOLIC BLOOD PRESSURE: 130 MMHG | TEMPERATURE: 98.3 F

## 2017-08-23 DIAGNOSIS — Z09 FOLLOW UP: Primary | ICD-10-CM

## 2017-08-23 DIAGNOSIS — Z79.01 CHRONIC ANTICOAGULATION: ICD-10-CM

## 2017-08-23 DIAGNOSIS — K62.5 BRIGHT RED RECTAL BLEEDING: ICD-10-CM

## 2017-08-23 DIAGNOSIS — I48.20 CHRONIC ATRIAL FIBRILLATION (HCC): ICD-10-CM

## 2017-08-23 LAB
INR BLD: 1.3
PT POC: 16.1 SECONDS
VALID INTERNAL CONTROL?: YES

## 2017-08-23 NOTE — PROGRESS NOTES
Jen Lehman is a 80 y.o. male who presents to the office today with the following:  Chief Complaint   Patient presents with    Anal Bleeding     follow up       HPI  Here for f/u anal bleeding after bout of gastroenteritis. Saw PCP Dr. Osvaldo Lund last week, who is currently out of town. Was told to hold Coumadin x 2 days and resume at lower dose of 5 mg daily. It is Wed today, he was seen last Richad Shadow and resumed Coumadin Sunday night. He reports the bleeding has fully subsided and is feeling well today. His CBC came back normal.   Has had no recurring symptoms of the gastroenteritis. Otherwise feeling well with no other complaints or acute concerns. Has been applying Proctosol and clotrimazole cream locally and rectally twice daily as instructed with relief. Review of Systems   Constitutional: Negative for chills, fever and malaise/fatigue. Eyes: Negative. Respiratory: Negative for shortness of breath and wheezing. Cardiovascular: Negative for chest pain. Gastrointestinal: Negative. Negative for abdominal pain, blood in stool, constipation, diarrhea, melena, nausea and vomiting. Genitourinary: Negative. Skin: Negative for rash. Neurological: Negative. Negative for dizziness, sensory change, focal weakness and headaches. Endo/Heme/Allergies: Does not bruise/bleed easily. See HPI. Allergies   Allergen Reactions    Niaspan [Niacin] Other (comments)    Statins-Hmg-Coa Reductase Inhibitors Other (comments)     Body aches    Sulfa (Sulfonamide Antibiotics) Unknown (comments)       Current Outpatient Prescriptions   Medication Sig    clotrimazole (LOTRIMIN) 1 % topical cream Apply  to affected area two (2) times a day.  hydrocortisone (PROCTOSOL HC) 2.5 % rectal cream Insert  into rectum two (2) times a day.  Also ally externally BID    warfarin (COUMADIN) 5 mg tablet take 1 1/2 tablets by mouth once daily or as directed (Patient taking differently: 5 mg.)    atenolol (TENORMIN) 25 mg tablet TAKE 1 TABLET BY MOUTH EVERY DAY    furosemide (LASIX) 20 mg tablet Take 1 Tab by mouth daily.  prednisoLONE sodium phosphate (INFLAMASE FORTE) 1 % ophthalmic solution     OXYGEN-AIR DELIVERY SYSTEMS 2 L by Nasal route. 2 liters o2     No current facility-administered medications for this visit. Past Medical History:   Diagnosis Date    Atrial fibrillation (HCC)     Chronic airway obstruction, not elsewhere classified     Chronic anticoagulation     Edema     Hypercholesterolemia     Hypertension     Prostate CA ([address]        Past Surgical History:   Procedure Laterality Date    HX PROSTATECTOMY  2008       Social History     Social History    Marital status: UNKNOWN     Spouse name: N/A    Number of children: N/A    Years of education: N/A     Social History Main Topics    Smoking status: Former Smoker     Types: Cigarettes     Quit date: 9/24/1990    Smokeless tobacco: Never Used    Alcohol use No    Drug use: No    Sexual activity: No     Other Topics Concern    None     Social History Narrative       Family History   Problem Relation Age of Onset    Heart Disease Father     Alcohol abuse Sister          Physical Exam:  Visit Vitals    /70 (BP 1 Location: Left arm, BP Patient Position: Sitting)    Pulse 68    Temp 98.3 °F (36.8 °C) (Oral)    Resp 16    Ht 6' 4\" (1.93 m)    Wt 199 lb (90.3 kg)    SpO2 98%    BMI 24.22 kg/m2     Physical Exam   Constitutional: He is oriented to person, place, and time and well-developed, well-nourished, and in no distress. Tall thin, very pleasant, WM. Sitting comfortably on portable O2 with nasal cannula. HENT:   Head: Normocephalic and atraumatic. Right Ear: External ear normal.   Left Ear: External ear normal.   Eyes: Conjunctivae are normal.   Neck: Neck supple. Cardiovascular: Normal rate and regular rhythm. Pulmonary/Chest: Effort normal and breath sounds normal. No respiratory distress.  He has no wheezes. He has no rales. Abdominal: Soft. He exhibits no distension and no mass. There is no tenderness. There is no rebound and no guarding. Genitourinary: Rectal exam shows no external hemorrhoid and no tenderness. Genitourinary Comments: Declines repeat rectal as sxs have resolved, but did allow for a quick external visualization which shows some mild irritation, but no bleeding/rash or lesions. Neurological: He is alert and oriented to person, place, and time. Skin: Skin is warm and dry. There is erythema (very mild around anus, otherwise without lesions/wounds/ulcers). Psychiatric: Mood and affect normal.   Nursing note and vitals reviewed. Assessment/Plan:    ICD-10-CM ICD-9-CM    1. Follow up Z09 V67.9    2. Bright red rectal bleeding K62.5 569.3    3. Chronic atrial fibrillation (HCC) I48.2 427.31 AMB POC PT/INR      COLLECTION CAPILLARY BLOOD SPECIMEN   4. Chronic anticoagulation Z79.01 V58.61 AMB POC PT/INR      COLLECTION CAPILLARY BLOOD SPECIMEN     Results for orders placed or performed in visit on 08/23/17   AMB POC PT/INR   Result Value Ref Range    VALID INTERNAL CONTROL POC Yes     Prothrombin time (POC) 16.1 seconds    INR POC 1.3      Has only had three doses, rec continue at current regimen at 5 mg daily and f/u Mon/Tues for repeat PT/INR to determine if further adjustments needed. Will consult with PCP for when he would like him to return to see him and advice on coumadin adj next wk. Pt instructed to rtc in meantime for any new or recurring sxs. Continue home care instructions per last visit. In interim, to seek immediate care for any red flag sxs as discussed (ie severe or unusual bleeding/injury, cp, sob, lightheadedness, weakness, fever. .etc). He is currently asxs and doing well. VS remain stable. Follow-up Disposition:  Return first thing next week for repeat PT/INR (around 8/28/2017 -  8/29/2017), or sooner prn.     Jimmy Esquivel PA-C

## 2017-08-29 ENCOUNTER — CLINICAL SUPPORT (OUTPATIENT)
Dept: FAMILY MEDICINE CLINIC | Age: 82
End: 2017-08-29

## 2017-08-29 ENCOUNTER — TELEPHONE (OUTPATIENT)
Dept: FAMILY MEDICINE CLINIC | Age: 82
End: 2017-08-29

## 2017-08-29 DIAGNOSIS — Z79.01 CHRONIC ANTICOAGULATION: ICD-10-CM

## 2017-08-29 DIAGNOSIS — I48.20 CHRONIC ATRIAL FIBRILLATION (HCC): Primary | ICD-10-CM

## 2017-08-29 LAB
INR BLD: 1.5
PT POC: 18.4 SECONDS
VALID INTERNAL CONTROL?: YES

## 2017-08-29 NOTE — PROGRESS NOTES
INR a bit low on current dose of Coumadin 5 mg daily  Was too high on 7.5 mg daily  Patient to increase his dose to 5 alternating with 7.5 mg every other day  Keep follow-up this week

## 2017-08-29 NOTE — PROGRESS NOTES
I have called and talked to this patient, advised of Dr Nimo Arreguin response to his INR results. Patient verbalizes understanding.   Em Ruiz RN

## 2017-08-29 NOTE — PROGRESS NOTES
CBC normal.  Patient's rectal bleeding has improved.   He has been called and reminded to come back in for repeat PT/INR

## 2017-08-30 ENCOUNTER — OFFICE VISIT (OUTPATIENT)
Dept: FAMILY MEDICINE CLINIC | Age: 82
End: 2017-08-30

## 2017-08-30 VITALS
RESPIRATION RATE: 20 BRPM | SYSTOLIC BLOOD PRESSURE: 119 MMHG | HEART RATE: 67 BPM | BODY MASS INDEX: 24.52 KG/M2 | DIASTOLIC BLOOD PRESSURE: 65 MMHG | TEMPERATURE: 96.8 F | WEIGHT: 201.4 LBS | OXYGEN SATURATION: 98 %

## 2017-08-30 DIAGNOSIS — Z23 ENCOUNTER FOR IMMUNIZATION: ICD-10-CM

## 2017-08-30 DIAGNOSIS — I10 ESSENTIAL HYPERTENSION: ICD-10-CM

## 2017-08-30 DIAGNOSIS — E78.00 HYPERCHOLESTEROLEMIA: ICD-10-CM

## 2017-08-30 DIAGNOSIS — Z85.46 HISTORY OF PROSTATE CANCER: ICD-10-CM

## 2017-08-30 DIAGNOSIS — I48.20 CHRONIC ATRIAL FIBRILLATION (HCC): Primary | ICD-10-CM

## 2017-08-30 DIAGNOSIS — K62.5 RECTAL BLEEDING: ICD-10-CM

## 2017-08-30 DIAGNOSIS — J44.9 CHRONIC OBSTRUCTIVE PULMONARY DISEASE, UNSPECIFIED COPD TYPE (HCC): ICD-10-CM

## 2017-08-30 NOTE — MR AVS SNAPSHOT
Visit Information Date & Time Provider Department Dept. Phone Encounter #  
 8/30/2017  1:00 PM Sally Berry  [facility] [phone] [id number] Follow-up Instructions Return in about 3 months (around 11/30/2017). Your Appointments 2/13/2018 10:00 AM  
Medicare Physical with Kristina Parkinson MD  
175 [facility] ([facility]) Appt Note: Medicare physical cp40 7/10/17 [facility] [address] [address]  
[phone]  
  
   
 19 [facility] [address] Upcoming Health Maintenance Date Due  
 GLAUCOMA SCREENING Q2Y 2/1/2000 INFLUENZA AGE 9 TO ADULT 8/1/2017 MEDICARE YEARLY EXAM 2/10/2018 DTaP/Tdap/Td series (2 - Td) 7/10/2027 Allergies as of 8/30/2017  Review Complete On: 8/30/2017 By: Sally Berry MD  
  
 Severity Noted Reaction Type Reactions Niaspan [Niacin]  12/03/2013    Other (comments) Statins-hmg-coa Reductase Inhibitors  09/24/2013   Side Effect Other (comments) Body aches Sulfa (Sulfonamide Antibiotics)  09/24/2013    Unknown (comments) Current Immunizations  Reviewed on 2/9/2017 Name Date Influenza High Dose Vaccine PF  Incomplete, 10/19/2016, 10/5/2015 Influenza Vaccine 9/24/2014 Pneumococcal Conjugate (PCV-13) 2/9/2017 Pneumococcal Polysaccharide (PPSV-23) 10/5/2015, 9/19/2014 Not reviewed this visit You Were Diagnosed With   
  
 Codes Comments Chronic atrial fibrillation (HCC)    -  Primary ICD-10-CM: A65.1 ICD-9-CM: 427.31 Rectal bleeding     ICD-10-CM: K62.5 ICD-9-CM: 569.3 Essential hypertension     ICD-10-CM: I10 
ICD-9-CM: 401.9 Hypercholesterolemia     ICD-10-CM: E78.00 ICD-9-CM: 272.0 Chronic obstructive pulmonary disease, unspecified COPD type ([facility]     ICD-10-CM: J44.9 ICD-9-CM: 204  History of prostate cancer     ICD-10-CM: Z85.46 
ICD-9-CM: V10.46   
 Encounter for immunization     ICD-10-CM: W60 ICD-9-CM: V03.89 Vitals BP Pulse Temp Resp Weight(growth percentile) SpO2  
 119/65 (BP 1 Location: Left arm, BP Patient Position: Sitting) 67 96.8 °F (36 °C) (Oral) 20 201 lb 6.4 oz (91.4 kg) 98% BMI Smoking Status 24.52 kg/m2 Former Smoker BMI and BSA Data Body Mass Index Body Surface Area 24.52 kg/m 2 2.21 m 2 Preferred Pharmacy Pharmacy Name Phone [facility] Your Updated Medication List  
  
   
This list is accurate as of: 8/30/17  1:30 PM.  Always use your most recent med list.  
  
  
  
  
 atenolol 25 mg tablet Commonly known as:  TENORMIN  
TAKE 1 TABLET BY MOUTH EVERY DAY  
  
 clotrimazole 1 % topical cream  
Commonly known as:  Petra Romp Apply  to affected area two (2) times a day. furosemide 20 mg tablet Commonly known as:  LASIX Take 1 Tab by mouth daily. hydrocortisone 2.5 % rectal cream  
Commonly known as:  PROCTOSOL HC Insert  into rectum two (2) times a day. Also ally externally BID OXYGEN-AIR DELIVERY SYSTEMS  
2 L by Nasal route. 2 liters o2  
  
 prednisoLONE sodium phosphate 1 % ophthalmic solution Commonly known as:  INFLAMASE FORTE  
  
 warfarin 5 mg tablet Commonly known as:  COUMADIN  
take 1 1/2 tablets by mouth once daily or as directed We Performed the Following INFLUENZA VIRUS VACCINE, HIGH DOSE SEASONAL, PRESERVATIVE FREE [07389 CPT(R)] METABOLIC PANEL, BASIC [06358 CPT(R)] PSA, DIAGNOSTIC (PROSTATE SPECIFIC AG) [id number] CPT(R)] Follow-up Instructions Return in about 3 months (around 11/30/2017). Patient Instructions Same meds Lab work today Nurse visit for PT/INR 1 week Please provide this summary of care documentation to your next provider. Your primary care clinician is listed as Reynold Christiansen. If you have any questions after today's visit, please call [phone].

## 2017-08-30 NOTE — PROGRESS NOTES
Chief Complaint   Patient presents with    Follow-up     PT/INR Coumadin check     Health Maintenance Due   Topic Date Due    GLAUCOMA SCREENING Q2Y  02/01/2000    INFLUENZA AGE 9 TO ADULT  08/01/2017     Visit Vitals    /65 (BP 1 Location: Left arm, BP Patient Position: Sitting)    Pulse 67    Temp 96.8 °F (36 °C) (Oral)    Resp 20    Wt 201 lb 6.4 oz (91.4 kg)    SpO2 98%    BMI 24.52 kg/m2     Henry David LPN

## 2017-08-30 NOTE — PROGRESS NOTES
Leonardo Greene is a 80 y.o. male who presents to the office today with the following:  Chief Complaint   Patient presents with    Follow-up     PT/INR Coumadin check       Allergies   Allergen Reactions    Niaspan [Niacin] Other (comments)    Statins-Hmg-Coa Reductase Inhibitors Other (comments)     Body aches    Sulfa (Sulfonamide Antibiotics) Unknown (comments)       Current Outpatient Prescriptions   Medication Sig    clotrimazole (LOTRIMIN) 1 % topical cream Apply  to affected area two (2) times a day.  hydrocortisone (PROCTOSOL HC) 2.5 % rectal cream Insert  into rectum two (2) times a day. Also ally externally BID    warfarin (COUMADIN) 5 mg tablet take 1 1/2 tablets by mouth once daily or as directed (Patient taking differently: 5 mg. Indications: pt taking 5mg alternating every other day w/7.5mg)    atenolol (TENORMIN) 25 mg tablet TAKE 1 TABLET BY MOUTH EVERY DAY    furosemide (LASIX) 20 mg tablet Take 1 Tab by mouth daily.  prednisoLONE sodium phosphate (INFLAMASE FORTE) 1 % ophthalmic solution     OXYGEN-AIR DELIVERY SYSTEMS 2 L by Nasal route. 2 liters o2     No current facility-administered medications for this visit. Past Medical History:   Diagnosis Date    Atrial fibrillation (HCC)     Chronic airway obstruction, not elsewhere classified     Chronic anticoagulation     Edema     Hypercholesterolemia     Hypertension     Prostate CA [facility])        Past Surgical History:   Procedure Laterality Date    HX PROSTATECTOMY  2008       History   Smoking Status    Former Smoker    Types: Cigarettes    Quit date: 9/24/1990   Smokeless Tobacco    Never Used       Family History   Problem Relation Age of Onset    Heart Disease Father     Alcohol abuse Sister          History of Present Illness:  Patient here for follow-up rectal bleeding patient was seen    On 17 August.  He had had gastroenteritis with malaise and vomiting.   Around the time he began to experience rectal bleeding. Patient is on Coumadin. At his visit he did have some erythematous macerated skin in the perirectal area. Digital exam revealed no obvious hemorrhoids but there was bright red blood on the glove. His CBC was normal.  We did hold his Coumadin for a couple of days. I treated him with Lotrimin cream and Proctofoam.  He states symptoms have resolved. He has had no further rectal bleeding. No prior history of rectal bleeding. I have discussed and recommended additional evaluation including colonoscopy to rule out polyps or colon cancer etc.  Patient is aware the indication but declines. Patient does have chronic atrial fibrillation on Coumadin. He was stable on 5/5/7.5 daily. We did hold his Coumadin. He restarted at 5 mg daily. INR was a bit low at 1.8 yesterday. I have asked him to go back to his previous dose 5/5/7 0.5. He will be back next week for repeat PT/INR. Most recent base metabolic profile normal.  He has no cardiac complaints. He was seen by cardiology initially with a diagnosis of atrial fibrillation. He did not need to follow-up with them. Patient does have hypertension. Controlled on his current regiment. He has no cardiac complaints. Most recent base metabolic profile is normal.    He does have hyperlipidemia. He has been intolerant to statins and Niaspan. He is not interested in treatment for his lipids at this point. He does have a history of COPD. He no longer smokes. He is on chronic oxygen therapy. O2 sats are normal.  He has no respiratory complaints. He is no longer on any inhalers. He does have a history of edema. On Lasix. No edema today. He has a remote history of prostate cancer with surgery about 9 years ago. No prostate complaints. Most recent PSA essentially 0 in 2014. He would like to have a repeat PSA at this point. His past medical history is otherwise negative    He has had the Zostavax and the pneumonia shot.   He was given a flu shot today          Review of Systems:    Review of systems negative except as noted above      Physical Exam:  Visit Vitals    /65 (BP 1 Location: Left arm, BP Patient Position: Sitting)    Pulse 67    Temp 96.8 °F (36 °C) (Oral)    Resp 20    Wt 201 lb 6.4 oz (91.4 kg)    SpO2 98%    BMI 24.52 kg/m2     Vitals:    08/30/17 1249   BP: 119/65   BP 1 Location: Left arm   BP Patient Position: Sitting   Pulse: 67   Resp: 20   Temp: 96.8 °F (36 °C)   TempSrc: Oral   SpO2: 98%   Weight: 201 lb 6.4 oz (91.4 kg)   Patient in no acute distress vital signs stable as above  Head was normocephalic  Eyes PERRLA  Neck no nodes no masses  Chest was clear no wheezes rhonchi or rales  Cor atrial fibrillation good rate control. No S3 no S4 no murmurs  Abdomen no obvious masses soft and nontender  External rectal exam improved from previous. She is scant residual erythema. No bleeding  Extremities no edema      Assessment/Plan: Overall patient improved from previous and medically stable. He is going to continue his current medication. He will resume his previous dose of Coumadin. Checking lab work today. He will be back next week for his PT/INR. Otherwise I will see him back in 3 months for ongoing medical follow-up      1. Chronic atrial fibrillation (HCC)  Controlled on current regiment    2. Rectal bleeding  Resolved    3. Essential hypertension  Controlled on current regimen  - METABOLIC PANEL, BASIC    4. Hypercholesterolemia  Intolerant to medications    5. Chronic obstructive pulmonary disease, unspecified COPD type ([address]  On oxygen    6. History of prostate cancer  Checking lab work  - PROSTATE SPECIFIC AG    7.  Encounter for immunization    - Influenza virus vaccine (FLUZONE HIGH DOSE) 65 years and older (33073)    Patient Instructions   Same meds  Go back to previous dose of Coumadin 5/5/7.5  Lab work today  Nurse visit for PT/INR 1 week            Continue current therapy plan except for indicated above. Verbal and written instructions (see AVS) provided.  Patient expresses understanding of diagnosis and treatment plan. Follow-up Disposition:  Return in about 3 months (around 11/30/2017). [place].  Jacquelyn Roland MD

## 2017-08-30 NOTE — PATIENT INSTRUCTIONS
Same meds  Go back to previous dose of Coumadin 5/5/7.5  Lab work today  Nurse visit for PT/INR 1 week

## 2017-08-31 LAB
BUN SERPL-MCNC: 11 MG/DL (ref 8–27)
BUN/CREAT SERPL: 14 (ref 10–24)
CALCIUM SERPL-MCNC: 9.1 MG/DL (ref 8.6–10.2)
CHLORIDE SERPL-SCNC: 98 MMOL/L (ref 96–106)
CO2 SERPL-SCNC: 27 MMOL/L (ref 18–29)
CREAT SERPL-MCNC: 0.8 MG/DL (ref 0.76–1.27)
GLUCOSE SERPL-MCNC: 99 MG/DL (ref 65–99)
POTASSIUM SERPL-SCNC: 4.2 MMOL/L (ref 3.5–5.2)
PSA SERPL-MCNC: <0.1 NG/ML (ref 0–4)
SODIUM SERPL-SCNC: 140 MMOL/L (ref 134–144)

## 2017-08-31 NOTE — PROGRESS NOTES
Recent lab work reviewed  All lab tests including prostate blood test look good, within acceptable limits  Patient should continue the current medications  Continue healthy diet    Keep planned follow-up

## 2017-11-01 ENCOUNTER — OFFICE VISIT (OUTPATIENT)
Dept: FAMILY MEDICINE CLINIC | Age: 82
End: 2017-11-01

## 2017-11-01 VITALS
HEIGHT: 76 IN | DIASTOLIC BLOOD PRESSURE: 63 MMHG | TEMPERATURE: 97.8 F | SYSTOLIC BLOOD PRESSURE: 123 MMHG | WEIGHT: 200 LBS | OXYGEN SATURATION: 98 % | BODY MASS INDEX: 24.36 KG/M2 | HEART RATE: 73 BPM | RESPIRATION RATE: 18 BRPM

## 2017-11-01 DIAGNOSIS — E78.00 HYPERCHOLESTEROLEMIA: ICD-10-CM

## 2017-11-01 DIAGNOSIS — I48.20 CHRONIC ATRIAL FIBRILLATION (HCC): Primary | ICD-10-CM

## 2017-11-01 DIAGNOSIS — I10 ESSENTIAL HYPERTENSION: ICD-10-CM

## 2017-11-01 DIAGNOSIS — J44.9 CHRONIC OBSTRUCTIVE PULMONARY DISEASE, UNSPECIFIED COPD TYPE (HCC): ICD-10-CM

## 2017-11-01 LAB
INR BLD: 2.1
PT POC: 25.6 SECONDS
VALID INTERNAL CONTROL?: YES

## 2017-11-01 RX ORDER — WARFARIN SODIUM 5 MG/1
TABLET ORAL
Qty: 135 TAB | Refills: 1 | Status: SHIPPED | OUTPATIENT
Start: 2017-11-01 | End: 2018-05-07 | Stop reason: SDUPTHER

## 2017-11-01 NOTE — PROGRESS NOTES
INR stable. Patient told to stay on current medications and recheck in 1 month.   Flow sheet was updated

## 2017-11-01 NOTE — PROGRESS NOTES
Bridget Vogt is a 80 y.o. male who presents to the office today with the following:  Chief Complaint   Patient presents with    Anticoagulation     coumadin    Medication Refill    O2/Oxygen     pt reports having difficulty dealing with O2 supply company ([employer])       Allergies   Allergen Reactions    Niaspan [Niacin] Other (comments)    Statins-Hmg-Coa Reductase Inhibitors Other (comments)     Body aches    Sulfa (Sulfonamide Antibiotics) Unknown (comments)       Current Outpatient Prescriptions   Medication Sig    warfarin (COUMADIN) 5 mg tablet take 1- 1 1/2 tablets by mouth once daily or as directed    atenolol (TENORMIN) 25 mg tablet TAKE 1 TABLET BY MOUTH EVERY DAY    furosemide (LASIX) 20 mg tablet Take 1 Tab by mouth daily.  prednisoLONE sodium phosphate (INFLAMASE FORTE) 1 % ophthalmic solution     OXYGEN-AIR DELIVERY SYSTEMS 2 L by Nasal route. 2 liters o2    clotrimazole (LOTRIMIN) 1 % topical cream Apply  to affected area two (2) times a day.  hydrocortisone (PROCTOSOL HC) 2.5 % rectal cream Insert  into rectum two (2) times a day. Also ally externally BID     No current facility-administered medications for this visit. Past Medical History:   Diagnosis Date    Atrial fibrillation (HCC)     Chronic airway obstruction, not elsewhere classified     Chronic anticoagulation     Edema     Hypercholesterolemia     Hypertension     Prostate CA [facility])        Past Surgical History:   Procedure Laterality Date    HX PROSTATECTOMY  2008       History   Smoking Status    Former Smoker    Types: Cigarettes    Quit date: 9/24/1990   Smokeless Tobacco    Never Used       Family History   Problem Relation Age of Onset    Heart Disease Father     Alcohol abuse Sister          History of Present Illness:  Patient here for follow-up rectal bleeding patient was seen    Patient seen August 2017. He had had gastroenteritis with malaise and vomiting.   Around the time he began to experience rectal bleeding. Patient is on Coumadin. At his visit he did have some erythematous macerated skin in the perirectal area. Digital exam revealed no obvious hemorrhoids but there was bright red blood on the glove. His CBC was normal.  We did hold his Coumadin for a couple of days. I treated him with Lotrimin cream and Proctofoam.  He states symptoms have resolved. He has had no further rectal bleeding. No prior history of rectal bleeding. I have discussed and recommended additional evaluation including colonoscopy to rule out polyps or colon cancer etc.  Patient is aware the indication but declines. Patient does have chronic atrial fibrillation on Coumadin. Compliant with his Coumadin 5/5/7.5. INR stable and in range today. Will recheck 1 month. He has no cardiac complaints. He was seen by cardiology initially with a diagnosis of atrial fibrillation. He was told he did not need to follow-up with them. Recent CBC normal    Patient does have hypertension. Controlled on his current regiment. He has no cardiac complaints. Most recent base metabolic profile is normal August 2017. He does have hyperlipidemia. He has been intolerant to statins and Niaspan. He is not interested in treatment for his lipids at this point. He does have a history of COPD. He no longer smokes. He is on chronic oxygen therapy. O2 sats are normal.  He has no respiratory complaints. He is no longer on any inhalers. No respiratory complaints today. He states he uses his oxygen when in the car at that home. O2 sat normal on room air in my office today  He does have a history of edema. On Lasix. No edema today. Recent BMP normal    He has a remote history of prostate cancer with surgery about 9 years ago. No prostate complaints. Most recent PSA essentially 0 in August 2017    His past medical history is otherwise negative    He has had the Zostavax and the pneumonia shot.   He was given a flu shot 8/17          Review of Systems:    Review of systems negative except as noted above      Physical Exam:  Visit Vitals    /63 (BP 1 Location: Left arm, BP Patient Position: Sitting)    Pulse 73    Temp 97.8 °F (36.6 °C) (Oral)    Resp 18    Ht 6' 4\" (1.93 m)    Wt 200 lb (90.7 kg)    SpO2 98%    BMI 24.34 kg/m2     Vitals:    11/01/17 1244   BP: 123/63   BP 1 Location: Left arm   BP Patient Position: Sitting   Pulse: 73   Resp: 18   Temp: 97.8 °F (36.6 °C)   TempSrc: Oral   SpO2: 98%   Weight: 200 lb (90.7 kg)   Height: 6' 4\" (1.93 m)   Patient no acute distress vital signs stable as above  Head is normocephalic  External ears normal.  Ear canals normal TMs were partially obscured with cerumen. Visualized portions clear. Hearing was normal  Eyes PERRLA. EOMs full. Sclera clear. Patient sees his eye doctor regularly  Nose within normal limits. Nares  normal.  No significant congestion  OP mucosa normal, no obvious lesions. Pharynx normal.  No erythema or exudate. Structures midline. Dentures in place  Neck no nodes no masses no bruits  Chest was clear no wheezes rhonchi or rales. Good air exchange  Cor atrial fibrillation good rate control and rhythm no S3-S4 no murmurs  Abdomen no HSM no masses soft and was nontender  Extremities no edema. Nontender. Good range of motion  Gait and gross neurologic exam were normal    1. Chronic atrial fibrillation (HCC)  Controlled on his current regimen. Overall patient quite medically stable. He is can continue with his current medications. Come in for monthly pro times or sooner as directed. I will plan to see him back in 6 months sooner if needed    - COLLECTION CAPILLARY BLOOD SPECIMEN  - AMB POC PT/INR  - warfarin (COUMADIN) 5 mg tablet; take 1- 1 1/2 tablets by mouth once daily or as directed  Dispense: 135 Tab; Refill: 1    2. Essential hypertension  Controlled on current regimen    3.  Chronic obstructive pulmonary disease, unspecified COPD type ([facility] [address]  Asymptomatic on supplemental oxygen    4. Hypercholesterolemia  Declining therapy    Patient Instructions   Continue current medications    Work on diet and exercise    Nurse visit December for coumadin test    Keep planned follow up visit Dr WANG in May          Continue current therapy plan except for indicated above. Verbal and written instructions (see AVS) provided.  Patient expresses understanding of diagnosis and treatment plan. Follow-up Disposition:  Return in about 6 months (around 5/1/2018). Osman Moreland.  Ton Mckinnon MD

## 2017-11-01 NOTE — PROGRESS NOTES
Identified pt with two pt identifiers(name and ). Reviewed record in preparation for visit and have obtained necessary documentation. Chief Complaint   Patient presents with    Anticoagulation     coumadin    Medication Refill    O2/Oxygen     pt reports having difficulty dealing with O2 supply company ([address])        There are no preventive care reminders to display for this patient. Coordination of Care Questionnaire:  :   1) Have you been to an emergency room, urgent care clinic since your last visit? no   Hospitalized since your last visit? no             2. Have seen or consulted any other health care provider since your last visit? NO  If yes, where when, and reason for visit? 3) Do you have an Advanced Directive/ Living Will in place? YES  If yes, do we have a copy on file YES  If no, would you like information NO    Patient is accompanied by self I have received verbal consent from John Verma to discuss any/all medical information while they are present in the room.

## 2017-11-06 ENCOUNTER — OFFICE VISIT (OUTPATIENT)
Dept: FAMILY MEDICINE CLINIC | Age: 82
End: 2017-11-06

## 2017-11-06 VITALS
DIASTOLIC BLOOD PRESSURE: 87 MMHG | TEMPERATURE: 98.5 F | OXYGEN SATURATION: 98 % | SYSTOLIC BLOOD PRESSURE: 137 MMHG | HEIGHT: 76 IN | BODY MASS INDEX: 24.23 KG/M2 | WEIGHT: 199 LBS | RESPIRATION RATE: 18 BRPM | HEART RATE: 74 BPM

## 2017-11-06 DIAGNOSIS — J06.9 VIRAL URI: Primary | ICD-10-CM

## 2017-11-06 DIAGNOSIS — J02.9 SORE THROAT: ICD-10-CM

## 2017-11-06 LAB
S PYO AG THROAT QL: NEGATIVE
VALID INTERNAL CONTROL?: YES

## 2017-11-06 NOTE — PROGRESS NOTES
Jin Hansen is a 80 y.o. male who presents to the office today with the following:  Chief Complaint   Patient presents with    Sore Throat       HPI  Pt c/o ST since yesterday. Hurts to swallow sometimes, but mostly that was just yesterday. Has been feeling better since eating chicken noodle soup. Currently has no painful swallowing. Felt he had a temp, but did not check. Has been taking Tylenol. Has been coughing a little, not much, dry. Otherwise he says overall he has been feeling well with no other complaints or acute concerns. Denies any ear pain, congestion, sneezing, HA, sob, wheeze, cp, or GI sxs. Also no heartburn/indegestion. No seasonal allergies. Review of Systems   Constitutional: Negative for chills, fever and malaise/fatigue. HENT: Positive for sore throat. Negative for congestion, ear pain and sinus pain. Respiratory: Negative for cough, shortness of breath and wheezing. Cardiovascular: Negative for chest pain and leg swelling. Gastrointestinal: Negative. Negative for abdominal pain, diarrhea, nausea and vomiting. Genitourinary: Negative. Musculoskeletal: Negative for myalgias. Skin: Negative for rash. Neurological: Negative for dizziness and headaches. See HPI.     Past Medical History:   Diagnosis Date    Atrial fibrillation ([facility] [address]     Chronic airway obstruction, not elsewhere classified     Chronic anticoagulation     Edema     Hypercholesterolemia     Hypertension     Prostate CA [facility])        Past Surgical History:   Procedure Laterality Date    HX PROSTATECTOMY  2008       Allergies   Allergen Reactions    Niaspan [Niacin] Other (comments)    Statins-Hmg-Coa Reductase Inhibitors Other (comments)     Body aches    Sulfa (Sulfonamide Antibiotics) Unknown (comments)       Current Outpatient Prescriptions   Medication Sig    warfarin (COUMADIN) 5 mg tablet take 1- 1 1/2 tablets by mouth once daily or as directed    clotrimazole (LOTRIMIN) 1 % topical cream Apply  to affected area two (2) times a day.  hydrocortisone (PROCTOSOL HC) 2.5 % rectal cream Insert  into rectum two (2) times a day. Also ally externally BID    atenolol (TENORMIN) 25 mg tablet TAKE 1 TABLET BY MOUTH EVERY DAY    furosemide (LASIX) 20 mg tablet Take 1 Tab by mouth daily.  prednisoLONE sodium phosphate (INFLAMASE FORTE) 1 % ophthalmic solution     OXYGEN-AIR DELIVERY SYSTEMS 2 L by Nasal route. 2 liters o2     No current facility-administered medications for this visit. Social History     Social History    Marital status: UNKNOWN     Spouse name: N/A    Number of children: N/A    Years of education: N/A     Social History Main Topics    Smoking status: Former Smoker     Types: Cigarettes     Quit date: 9/24/1990    Smokeless tobacco: Never Used    Alcohol use No    Drug use: No    Sexual activity: No     Other Topics Concern    None     Social History Narrative       Family History   Problem Relation Age of Onset    Heart Disease Father     Alcohol abuse Sister          Physical Exam:  Visit Vitals    /87    Pulse 74    Temp 98.5 °F (36.9 °C) (Oral)    Resp 18    Ht 6' 4\" (1.93 m)    Wt 199 lb (90.3 kg)    SpO2 98%    BMI 24.22 kg/m2     Physical Exam   Constitutional: He is oriented to person, place, and time and well-developed, well-nourished, and in no distress. HENT:   Head: Normocephalic and atraumatic. Right Ear: External ear normal.   Left Ear: External ear normal.   Nose: Mucosal edema (mildly boggy) present. Right sinus exhibits no maxillary sinus tenderness and no frontal sinus tenderness. Left sinus exhibits no maxillary sinus tenderness and no frontal sinus tenderness. Mouth/Throat: Oropharynx is clear and moist.   Eyes: Conjunctivae are normal.   Neck: Normal range of motion. Neck supple. Cardiovascular: Normal rate. Pulmonary/Chest: Effort normal. He has no rhonchi. He has no rales.    Good air exchange, a very mild/faint transient wheeze at end of exp. Abdominal: Soft. There is no tenderness. Lymphadenopathy:     He has no cervical adenopathy. Neurological: He is alert and oriented to person, place, and time. Gait normal.   Skin: Skin is warm and dry. Psychiatric: Mood and affect normal.   Nursing note and vitals reviewed. Assessment/Plan:    ICD-10-CM ICD-9-CM    1. Viral URI J06.9 465.9     B97.89     2. Sore throat J02.9 462 AMB POC RAPID STREP A     Results for orders placed or performed in visit on 11/06/17   AMB POC RAPID STREP A   Result Value Ref Range    VALID INTERNAL CONTROL POC Yes     Group A Strep Ag Negative Negative     Explained to pt suspect viral, however, if sxs do not improve or worsen this week he needs to notify/rtc  I did offer handwritten abx, pt declines. Agrees to rtc if no improvement or worsening/new sxs. In meantime would like to try some conservative tx first.    Rec rest & fluids. Warm salt water gargles. Tylenol prn for discomfort. Counseled on otc meds for symptomatic relief. RTO if sxs persist/worsen or if develops any new sxs/concerns. To seek immediate care/to ER for any \"red flag\" sxs. Follow-up Disposition:  Return if symptoms worsen or fail to improve.     Kat Back PA-C

## 2017-11-06 NOTE — PATIENT INSTRUCTIONS
Sore Throat: Care Instructions  Your Care Instructions    Infection by bacteria or a virus causes most sore throats. Cigarette smoke, dry air, air pollution, allergies, and yelling can also cause a sore throat. Sore throats can be painful and annoying. Fortunately, most sore throats go away on their own. If you have a bacterial infection, your doctor may prescribe antibiotics. Follow-up care is a key part of your treatment and safety. Be sure to make and go to all appointments, and call your doctor if you are having problems. It's also a good idea to know your test results and keep a list of the medicines you take. How can you care for yourself at home? · If your doctor prescribed antibiotics, take them as directed. Do not stop taking them just because you feel better. You need to take the full course of antibiotics. · Gargle with warm salt water once an hour to help reduce swelling and relieve discomfort. Use 1 teaspoon of salt mixed in 1 cup of warm water. · Take an over-the-counter pain medicine, such as acetaminophen (Tylenol), ibuprofen (Advil, Motrin), or naproxen (Aleve). Read and follow all instructions on the label. · Be careful when taking over-the-counter cold or flu medicines and Tylenol at the same time. Many of these medicines have acetaminophen, which is Tylenol. Read the labels to make sure that you are not taking more than the recommended dose. Too much acetaminophen (Tylenol) can be harmful. · Drink plenty of fluids. Fluids may help soothe an irritated throat. Hot fluids, such as tea or soup, may help decrease throat pain. · Use over-the-counter throat lozenges to soothe pain. Regular cough drops or hard candy may also help. These should not be given to young children because of the risk of choking. · Do not smoke or allow others to smoke around you. If you need help quitting, talk to your doctor about stop-smoking programs and medicines.  These can increase your chances of quitting for good. · Use a vaporizer or humidifier to add moisture to your bedroom. Follow the directions for cleaning the machine. When should you call for help? Call your doctor now or seek immediate medical care if:  ? · You have new or worse trouble swallowing. ? · Your sore throat gets much worse on one side. ? Watch closely for changes in your health, and be sure to contact your doctor if you do not get better as expected. Where can you learn more? Go to http://connie-giovanni.info/. Enter [id number] [id number] in the search box to learn more about \"Sore Throat: Care Instructions. \"  Current as of: May 12, 2017  Content Version: 11.4  © 3776-5299 [employer]. Care instructions adapted under license by [employer] (which disclaims liability or warranty for this information). If you have questions about a medical condition or this instruction, always ask your healthcare professional. [place] [address] any warranty or liability for your use of this information. Viral Respiratory Infection: Care Instructions  Your Care Instructions    Viruses are very small organisms. They grow in number after they enter your body. There are many types that cause different illnesses, such as colds and the mumps. The symptoms of a viral respiratory infection often start quickly. They include a fever, sore throat, and runny nose. You may also just not feel well. Or you may not want to eat much. Most viral respiratory infections are not serious. They usually get better with time and self-care. Antibiotics are not used to treat a viral infection. That's because antibiotics will not help cure a viral illness. In some cases, antiviral medicine can help your body fight a serious viral infection. Follow-up care is a key part of your treatment and safety. Be sure to make and go to all appointments, and call your doctor if you are having problems.  It's also a good idea to know your test results and keep a list of the medicines you take. How can you care for yourself at home? · Rest as much as possible until you feel better. · Be safe with medicines. Take your medicine exactly as prescribed. Call your doctor if you think you are having a problem with your medicine. You will get more details on the specific medicine your doctor prescribes. · Take an over-the-counter pain medicine, such as acetaminophen (Tylenol), ibuprofen (Advil, Motrin), or naproxen (Aleve), as needed for pain and fever. Read and follow all instructions on the label. Do not give aspirin to anyone younger than 20. It has been linked to Reye syndrome, a serious illness. · Drink plenty of fluids, enough so that your urine is light yellow or clear like water. Hot fluids, such as tea or soup, may help relieve congestion in your nose and throat. If you have kidney, heart, or liver disease and have to limit fluids, talk with your doctor before you increase the amount of fluids you drink. · Try to clear mucus from your lungs by breathing deeply and coughing. · Gargle with warm salt water once an hour. This can help reduce swelling and throat pain. Use 1 teaspoon of salt mixed in 1 cup of warm water. · Do not smoke or allow others to smoke around you. If you need help quitting, talk to your doctor about stop-smoking programs and medicines. These can increase your chances of quitting for good. To avoid spreading the virus  · Cough or sneeze into a tissue. Then throw the tissue away. · If you don't have a tissue, use your hand to cover your cough or sneeze. Then clean your hand. You can also cough into your sleeve. · Wash your hands often. Use soap and warm water. Wash for 15 to 20 seconds each time. · If you don't have soap and water near you, you can clean your hands with alcohol wipes or gel. When should you call for help? Call your doctor now or seek immediate medical care if:  ? · You have a new or higher fever.    ? · Your fever lasts more than 48 hours. ? · You have trouble breathing. ? · You have a fever with a stiff neck or a severe headache. ? · You are sensitive to light. ? · You feel very sleepy or confused. ? Watch closely for changes in your health, and be sure to contact your doctor if:  ? · You do not get better as expected. Where can you learn more? Go to http://connie-giovanni.info/. Enter W492 in the search box to learn more about \"Viral Respiratory Infection: Care Instructions. \"  Current as of: May 12, 2017  Content Version: 11.4  © 7260-6175 [employer]. Care instructions adapted under license by [employer] (which disclaims liability or warranty for this information). If you have questions about a medical condition or this instruction, always ask your healthcare professional. [address] any warranty or liability for your use of this information.

## 2017-11-07 ENCOUNTER — TELEPHONE (OUTPATIENT)
Dept: FAMILY MEDICINE CLINIC | Age: 82
End: 2017-11-07

## 2017-11-07 ENCOUNTER — OFFICE VISIT (OUTPATIENT)
Dept: FAMILY MEDICINE CLINIC | Age: 82
End: 2017-11-07

## 2017-11-07 VITALS
RESPIRATION RATE: 20 BRPM | HEIGHT: 76 IN | WEIGHT: 200 LBS | SYSTOLIC BLOOD PRESSURE: 140 MMHG | TEMPERATURE: 99 F | DIASTOLIC BLOOD PRESSURE: 93 MMHG | OXYGEN SATURATION: 94 % | BODY MASS INDEX: 24.36 KG/M2 | HEART RATE: 94 BPM

## 2017-11-07 DIAGNOSIS — J44.0 CHRONIC OBSTRUCTIVE PULMONARY DISEASE WITH ACUTE LOWER RESPIRATORY INFECTION (HCC): Primary | ICD-10-CM

## 2017-11-07 DIAGNOSIS — J40 BRONCHITIS: ICD-10-CM

## 2017-11-07 RX ORDER — AZITHROMYCIN 250 MG/1
TABLET, FILM COATED ORAL
Qty: 6 TAB | Refills: 0 | Status: SHIPPED | OUTPATIENT
Start: 2017-11-07 | End: 2017-11-07 | Stop reason: SDUPTHER

## 2017-11-07 RX ORDER — PREDNISONE 10 MG/1
TABLET ORAL
Qty: 14 TAB | Refills: 0 | Status: SHIPPED | OUTPATIENT
Start: 2017-11-07 | End: 2018-01-29 | Stop reason: ALTCHOICE

## 2017-11-07 RX ORDER — PREDNISONE 10 MG/1
TABLET ORAL
Qty: 14 TAB | Refills: 0 | Status: SHIPPED | OUTPATIENT
Start: 2017-11-07 | End: 2017-11-07 | Stop reason: SDUPTHER

## 2017-11-07 RX ORDER — AZITHROMYCIN 250 MG/1
TABLET, FILM COATED ORAL
Qty: 6 TAB | Refills: 0 | Status: SHIPPED | OUTPATIENT
Start: 2017-11-07 | End: 2017-11-12

## 2017-11-07 RX ORDER — CEFTRIAXONE 1 G/1
1 INJECTION, POWDER, FOR SOLUTION INTRAMUSCULAR; INTRAVENOUS ONCE
Qty: 1 VIAL | Refills: 0
Start: 2017-11-07 | End: 2017-11-07

## 2017-11-07 NOTE — PROGRESS NOTES
Lynette Quiroga is a 80 y.o. male who presents to the office today with the following:  Chief Complaint   Patient presents with    Cough    Fever       Allergies   Allergen Reactions    Niaspan [Niacin] Other (comments)    Statins-Hmg-Coa Reductase Inhibitors Other (comments)     Body aches    Sulfa (Sulfonamide Antibiotics) Unknown (comments)       Current Outpatient Prescriptions   Medication Sig    azithromycin (ZITHROMAX) 250 mg tablet Take 2 tablets today, then take 1 tablet daily    cefTRIAXone (ROCEPHIN) 1 gram injection 1 g by IntraMUSCular route once for 1 dose.  predniSONE (DELTASONE) 10 mg tablet Take 2 tabs twice daily for 2 days then 1 tab twice daily for 2 days then one tab daily for 2 days    warfarin (COUMADIN) 5 mg tablet take 1- 1 1/2 tablets by mouth once daily or as directed    clotrimazole (LOTRIMIN) 1 % topical cream Apply  to affected area two (2) times a day.  hydrocortisone (PROCTOSOL HC) 2.5 % rectal cream Insert  into rectum two (2) times a day. Also ally externally BID    atenolol (TENORMIN) 25 mg tablet TAKE 1 TABLET BY MOUTH EVERY DAY    furosemide (LASIX) 20 mg tablet Take 1 Tab by mouth daily.  prednisoLONE sodium phosphate (INFLAMASE FORTE) 1 % ophthalmic solution     OXYGEN-AIR DELIVERY SYSTEMS 2 L by Nasal route. 2 liters o2     No current facility-administered medications for this visit.         Past Medical History:   Diagnosis Date    Atrial fibrillation (HCC)     Chronic airway obstruction, not elsewhere classified     Chronic anticoagulation     Edema     Hypercholesterolemia     Hypertension     Prostate [facility])        Past Surgical History:   Procedure Laterality Date    HX PROSTATECTOMY  2008       History   Smoking Status    Former Smoker    Types: Cigarettes    Quit date: 9/24/1990   Smokeless Tobacco    Never Used       Family History   Problem Relation Age of Onset    Heart Disease Father     Alcohol abuse Sister          History of Present Illness:  Patient here for evaluation respiratory symptoms    Patient complains 3 day history increasing respiratory symptoms. Initially was some nasal congestion sore throat and a slight cough. He was seen here yesterday. Strep screen was negative. He was treated symptomatically for viral URI. Since that time he is feeling worse. He has increasing cough bringing up some dark phlegm. He has been running a fever and has some generalized myalgias and malaise. No significant shortness of breath no chest pain. 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 He does not use inhalers. I offered a Ventolin treatment in the office today and he declined      Review of Systems:    Review of systems negative except as noted above      Physical Exam:  Visit Vitals    BP (!) 140/93    Pulse 94    Temp 99 °F (37.2 °C) (Oral)    Resp 20    Ht 6' 4\" (1.93 m)    Wt 200 lb (90.7 kg)    SpO2 94%    BMI 24.34 kg/m2     Vitals:    11/07/17 1308   BP: (!) 140/93   Pulse: 94   Resp: 20   Temp: 99 °F (37.2 °C)   TempSrc: Oral   SpO2: 94%   Weight: 200 lb (90.7 kg)   Height: 6' 4\" (1.93 m)     Patient appeared ill but was in no acute distress vitals as above. O2 sat acceptable on room air. Low-grade temp  Head was normocephalic  External ears were normal.  Ear canals normal.  TMs were clear  Nose external nose normal.  No lesions. Plus congestion    with yellow rhinorrhea  OP Mucosa normal.  Pharynx normal.  No erythema or exudate. Structures midline  Neck no nodes no masses  Chest did have some diminished breath sounds which is the patient's baseline. Slightly prolonged expiration phase. No obvious wheezes rhonchi or rales today. Cor regular rate and rhythm no murmurs. Sinus rhythm today    Assessment/Plan:  1.  Chronic obstructive pulmonary disease with acute lower respiratory infection ([address]  Patient with a history of O2 dependent COPD with worsening respiratory symptoms cough congestion and some diminished breath sounds. I am going to treat him with IM Rocephin today followed by Zithromax. Prednisone taper. As noted he declines Ventolin at this point. I am checking a chest x-ray on him. I am bringing him back in 2 days for follow-up sooner if he is not improving  - XR CHEST PA LAT; Future  - predniSONE (DELTASONE) 10 mg tablet; Take 2 tabs twice daily for 2 days then 1 tab twice daily for 2 days then one tab daily for 2 days  Dispense: 14 Tab; Refill: 0    2. Bronchitis    - XR CHEST PA LAT; Future  - azithromycin (ZITHROMAX) 250 mg tablet; Take 2 tablets today, then take 1 tablet daily  Dispense: 6 Tab; Refill: 0  - cefTRIAXone (ROCEPHIN) 1 gram injection; 1 g by IntraMUSCular route once for 1 dose. Dispense: 1 Vial; Refill: 0  - CEFTRIAXONE SODIUM INJECTION  MG (Qty 4 for 1 gm)  - THER/PROPH/DIAG INJECTION, SUBCUT/IM    Patient Instructions   Home, rest, lots of fluids, vaporizer if needed. rocephin injection today  Tonight start zithromax  Prednisone taper starting this afternoon  Over the counter cold medications if needed. Follow up Thursday sooner if worse           Continue current therapy plan except for indicated above. Verbal and written instructions (see AVS) provided.  Patient expresses understanding of diagnosis and treatment plan. Follow-up Disposition:  Return in about 2 days (around 11/9/2017). Lea Bueno.  Massiel Feng MD

## 2017-11-07 NOTE — TELEPHONE ENCOUNTER
Needs clarification on the Rocephin and Prednisone. Also need to cancel the prescriiptions that are at [facility] and redirect to [address] [place] [address].   (Patient is waiting, however, I did tell Gabbie Lopes I could not guarantee when you all would be able to get to this.)

## 2017-11-07 NOTE — PATIENT INSTRUCTIONS
Home, rest, lots of fluids, vaporizer if needed. rocephin injection today  Tonight start zithromax  Prednisone taper starting this afternoon  Over the counter cold medications if needed.   Follow up Thursday sooner if worse

## 2017-11-08 ENCOUNTER — TELEPHONE (OUTPATIENT)
Dept: FAMILY MEDICINE CLINIC | Age: 82
End: 2017-11-08

## 2017-11-10 DIAGNOSIS — J40 BRONCHITIS: ICD-10-CM

## 2017-11-10 DIAGNOSIS — J44.0 CHRONIC OBSTRUCTIVE PULMONARY DISEASE WITH ACUTE LOWER RESPIRATORY INFECTION (HCC): ICD-10-CM

## 2017-11-16 ENCOUNTER — DOCUMENTATION ONLY (OUTPATIENT)
Dept: FAMILY MEDICINE CLINIC | Age: 82
End: 2017-11-16

## 2017-11-16 NOTE — PROGRESS NOTES
Patient's daughter Gareth Ford called and states patient told her that Dr. Mile Fletcher told him not to come back to this office. I assured patient's daughter that that did not happen; at his last visit he was told to follow up in 2 days if he was no better. Per Gareth Ford, patient has trouble remembering things and she apologized if patient was rude to us. States patient has been \"hard to handle. \" Also, patient will need refills on medications. Gareth Ford tells me she will call his pharmacy to request refills.

## 2017-12-18 ENCOUNTER — CLINICAL SUPPORT (OUTPATIENT)
Dept: FAMILY MEDICINE CLINIC | Age: 82
End: 2017-12-18

## 2017-12-18 VITALS — DIASTOLIC BLOOD PRESSURE: 68 MMHG | SYSTOLIC BLOOD PRESSURE: 104 MMHG | OXYGEN SATURATION: 97 % | HEART RATE: 84 BPM

## 2017-12-18 DIAGNOSIS — I48.20 CHRONIC ATRIAL FIBRILLATION (HCC): Primary | ICD-10-CM

## 2017-12-18 LAB
INR BLD: 2.5
PT POC: 29.9 SECONDS
VALID INTERNAL CONTROL?: YES

## 2017-12-18 NOTE — PROGRESS NOTES
I have called and talked to this patient, advised him of lab results per Dr Bayron Seymour review. Patient verbalizes understanding.

## 2018-01-29 ENCOUNTER — OFFICE VISIT (OUTPATIENT)
Dept: FAMILY MEDICINE CLINIC | Age: 83
End: 2018-01-29

## 2018-01-29 VITALS
RESPIRATION RATE: 16 BRPM | TEMPERATURE: 96.4 F | WEIGHT: 197.6 LBS | DIASTOLIC BLOOD PRESSURE: 66 MMHG | HEART RATE: 71 BPM | SYSTOLIC BLOOD PRESSURE: 112 MMHG | OXYGEN SATURATION: 98 % | HEIGHT: 76 IN | BODY MASS INDEX: 24.06 KG/M2

## 2018-01-29 DIAGNOSIS — E78.00 HYPERCHOLESTEROLEMIA: ICD-10-CM

## 2018-01-29 DIAGNOSIS — Z79.01 CHRONIC ANTICOAGULATION: ICD-10-CM

## 2018-01-29 DIAGNOSIS — I48.20 CHRONIC ATRIAL FIBRILLATION (HCC): Primary | ICD-10-CM

## 2018-01-29 DIAGNOSIS — I10 ESSENTIAL HYPERTENSION: ICD-10-CM

## 2018-01-29 DIAGNOSIS — Z23 ENCOUNTER FOR IMMUNIZATION: ICD-10-CM

## 2018-01-29 LAB
INR BLD: 2.5
PT POC: 30.4 SECONDS
VALID INTERNAL CONTROL?: YES

## 2018-01-29 NOTE — PROGRESS NOTES
HISTORY OF PRESENT ILLNESS  Travon Mcpherson is a 80 y.o. male. Chief Complaint   Patient presents with    Medication Refill       HPI  Here for INR  Taking Coumadin 5mg every day except 7.5 mg on Tue and Fri    Needs the Flu shot    And needs refills    HTN  BP controlled    Hyperlipidemia  Not fasting now  Will come back for BW    Review of Systems   Constitutional: Negative for chills and fever. HENT: Negative for congestion. Respiratory: Negative for cough. Cardiovascular: Positive for leg swelling. Negative for chest pain. Gastrointestinal: Negative for diarrhea, nausea and vomiting. Endo/Heme/Allergies: Does not bruise/bleed easily. Past Medical History:   Diagnosis Date    Atrial fibrillation (HCC)     Chronic airway obstruction, not elsewhere classified     Chronic anticoagulation     Edema     Hypercholesterolemia     Hypertension     Prostate [facility])      Current Outpatient Prescriptions   Medication Sig Dispense Refill    warfarin (COUMADIN) 5 mg tablet take 1- 1 1/2 tablets by mouth once daily or as directed 135 Tab 1    atenolol (TENORMIN) 25 mg tablet TAKE 1 TABLET BY MOUTH EVERY DAY 90 Tab 3    furosemide (LASIX) 20 mg tablet Take 1 Tab by mouth daily. 90 Tab 3    prednisoLONE sodium phosphate (INFLAMASE FORTE) 1 % ophthalmic solution   0    OXYGEN-AIR DELIVERY SYSTEMS 2 L by Nasal route. 2 liters o2       Allergies   Allergen Reactions    Niaspan [Niacin] Other (comments)    Statins-Hmg-Coa Reductase Inhibitors Other (comments)     Body aches    Sulfa (Sulfonamide Antibiotics) Other (comments)     Patient doesn't remember the rxn     Visit Vitals    /66 (BP 1 Location: Right arm, BP Patient Position: Sitting)    Pulse 71    Temp 96.4 °F (35.8 °C) (Temporal)    Resp 16    Ht 6' 4\" (1.93 m)    Wt 197 lb 9.6 oz (89.6 kg)    SpO2 98%    BMI 24.05 kg/m2     Physical Exam   Constitutional: He is oriented to person, place, and time.  He appears well-developed and well-nourished. No distress. HENT:   Head: Normocephalic and atraumatic. Eyes: Conjunctivae and EOM are normal.   Cardiovascular: Normal rate and regular rhythm. Decreased heart sounds   Pulmonary/Chest: Effort normal and breath sounds normal.   Decreased BS   Musculoskeletal: He exhibits no edema. Neurological: He is alert and oriented to person, place, and time. Skin: Skin is warm and dry. Psychiatric: He has a normal mood and affect. Nursing note and vitals reviewed. Recent Results (from the past 12 hour(s))   AMB POC PT/INR    Collection Time: 01/29/18  1:45 PM   Result Value Ref Range    VALID INTERNAL CONTROL POC Yes     Prothrombin time (POC) 30.4 seconds    INR POC 2.5        ASSESSMENT and PLAN    ICD-10-CM ICD-9-CM    1. Chronic atrial fibrillation (HCC) I48.2 427.31    2. Chronic anticoagulation Z79.01 V58.61 AMB POC PT/INR   3. Encounter for immunization Z23 V03.89 INFLUENZA VIRUS VACCINE, HIGH DOSE SEASONAL, PRESERVATIVE FREE   4. Hypercholesterolemia E78.00 272.0 LIPID PANEL WITH LDL/HDL RATIO   5.  Essential hypertension M26 471.4 METABOLIC PANEL, COMPREHENSIVE      CBC WITH AUTOMATED DIFF

## 2018-03-16 ENCOUNTER — CLINICAL SUPPORT (OUTPATIENT)
Dept: FAMILY MEDICINE CLINIC | Age: 83
End: 2018-03-16

## 2018-03-16 DIAGNOSIS — Z79.01 CHRONIC ANTICOAGULATION: ICD-10-CM

## 2018-03-16 DIAGNOSIS — I48.91 ATRIAL FIBRILLATION, UNSPECIFIED TYPE (HCC): Primary | ICD-10-CM

## 2018-03-16 LAB
INR BLD: 3.3 (ref 2–3)
PT POC: 39.2 SECONDS (ref 10.4–14)
VALID INTERNAL CONTROL?: YES

## 2018-03-16 NOTE — PROGRESS NOTES
Coumadin Flowsheet Values Recorded Today: Date: 03/16/18 Sunday: 7.5  Monday: 5mg  Tuesday: 7.5  Wednesday: 7.5mg  Thursday: 5mg  Friday: 7.5mg  Saturday: 5mg  PROTIME results: 39.2  INR results: 3.3  Denies missing dose or extra dose?: Yes  Bleeding or unusual bruising?: No  Blood in stool or urine?: No  Coughing up blood?: No  Alcohol Use?: No  Any change in medications?: No  Using ASA or any NSAID?: No  Epistaxis?: No   There were no vitals filed for this visit.

## 2018-03-28 ENCOUNTER — CLINICAL SUPPORT (OUTPATIENT)
Dept: FAMILY MEDICINE CLINIC | Age: 83
End: 2018-03-28

## 2018-03-28 DIAGNOSIS — I48.91 ATRIAL FIBRILLATION, UNSPECIFIED TYPE (HCC): Primary | ICD-10-CM

## 2018-03-28 LAB
INR BLD: 2.6
PT POC: 31.3 SECONDS
VALID INTERNAL CONTROL?: YES

## 2018-03-28 NOTE — PROGRESS NOTES
INR goal range  Patient to continue current medications  Recheck 2 weeks  Remind patient to schedule routine follow-up with me in May

## 2018-04-05 ENCOUNTER — CLINICAL SUPPORT (OUTPATIENT)
Dept: FAMILY MEDICINE CLINIC | Age: 83
End: 2018-04-05

## 2018-04-05 VITALS — SYSTOLIC BLOOD PRESSURE: 118 MMHG | DIASTOLIC BLOOD PRESSURE: 62 MMHG | HEART RATE: 58 BPM | OXYGEN SATURATION: 97 %

## 2018-04-05 DIAGNOSIS — I48.20 CHRONIC ATRIAL FIBRILLATION (HCC): Primary | ICD-10-CM

## 2018-04-05 LAB
INR BLD: 3.3
PT POC: 40 SECONDS
VALID INTERNAL CONTROL?: YES

## 2018-04-05 NOTE — PROGRESS NOTES
I have called and advised this patient of INR results per Dr Brown Favors review, I have updated patient's flow sheet.

## 2018-04-05 NOTE — PROGRESS NOTES
Call pt, the INR is high again decrease the dose to 5 mg every day and 7.5 mg only on Mon and recheck in 2 w

## 2018-04-19 ENCOUNTER — CLINICAL SUPPORT (OUTPATIENT)
Dept: FAMILY MEDICINE CLINIC | Age: 83
End: 2018-04-19

## 2018-04-19 DIAGNOSIS — I48.20 CHRONIC ATRIAL FIBRILLATION (HCC): Primary | ICD-10-CM

## 2018-04-19 LAB
INR BLD: 3.8
PT POC: 45.8 SECONDS
VALID INTERNAL CONTROL?: YES

## 2018-04-19 NOTE — PROGRESS NOTES
Patient was notified of Cortes's recommendation at his visit, and written directions were given to him.

## 2018-04-19 NOTE — PROGRESS NOTES
Pt was taking 7.5mg twice weekly and 5mg other days, per Dr. Eloise Joyner from last visit was only suppose to do 7.5mg on Mondays. rec pt hold dose x 1 and resume Dr. Eloise Joyner rec schedule and recheck in 2 wks.

## 2018-04-26 ENCOUNTER — CLINICAL SUPPORT (OUTPATIENT)
Dept: FAMILY MEDICINE CLINIC | Age: 83
End: 2018-04-26

## 2018-04-26 DIAGNOSIS — I48.20 CHRONIC ATRIAL FIBRILLATION (HCC): Primary | ICD-10-CM

## 2018-04-26 LAB
INR BLD: 2.1
PT POC: 25.1 SECONDS
VALID INTERNAL CONTROL?: YES

## 2018-04-26 NOTE — PROGRESS NOTES
INR improved and in goal range  Patient should continue his current medication  Recheck 2 weeks  Patient is due for routine follow-up with me  He should schedule that appointment in 2 weeks and we can do the INR on that day  Please notify patient and update flowsheet

## 2018-05-07 ENCOUNTER — OFFICE VISIT (OUTPATIENT)
Dept: FAMILY MEDICINE CLINIC | Age: 83
End: 2018-05-07

## 2018-05-07 VITALS
HEART RATE: 67 BPM | TEMPERATURE: 97.6 F | OXYGEN SATURATION: 96 % | SYSTOLIC BLOOD PRESSURE: 151 MMHG | BODY MASS INDEX: 24.23 KG/M2 | RESPIRATION RATE: 18 BRPM | HEIGHT: 76 IN | WEIGHT: 199 LBS | DIASTOLIC BLOOD PRESSURE: 74 MMHG

## 2018-05-07 DIAGNOSIS — J44.0 CHRONIC OBSTRUCTIVE PULMONARY DISEASE WITH ACUTE LOWER RESPIRATORY INFECTION (HCC): ICD-10-CM

## 2018-05-07 DIAGNOSIS — R60.9 EDEMA, UNSPECIFIED TYPE: ICD-10-CM

## 2018-05-07 DIAGNOSIS — Z79.01 CHRONIC ANTICOAGULATION: ICD-10-CM

## 2018-05-07 DIAGNOSIS — I10 ESSENTIAL HYPERTENSION: ICD-10-CM

## 2018-05-07 DIAGNOSIS — D64.9 ANEMIA, UNSPECIFIED TYPE: ICD-10-CM

## 2018-05-07 DIAGNOSIS — I48.20 CHRONIC ATRIAL FIBRILLATION (HCC): Primary | ICD-10-CM

## 2018-05-07 DIAGNOSIS — Z85.46 HISTORY OF PROSTATE CANCER: ICD-10-CM

## 2018-05-07 LAB
INR BLD: 2.3
PT POC: 28.2 SECONDS
VALID INTERNAL CONTROL?: YES

## 2018-05-07 RX ORDER — FUROSEMIDE 20 MG/1
20 TABLET ORAL DAILY
Qty: 90 TAB | Refills: 3 | Status: SHIPPED | OUTPATIENT
Start: 2018-05-07 | End: 2018-05-30 | Stop reason: SDUPTHER

## 2018-05-07 RX ORDER — ATENOLOL 25 MG/1
TABLET ORAL
Qty: 90 TAB | Refills: 3 | Status: SHIPPED | OUTPATIENT
Start: 2018-05-07 | End: 2019-04-26 | Stop reason: SDUPTHER

## 2018-05-07 RX ORDER — WARFARIN SODIUM 5 MG/1
TABLET ORAL
Qty: 135 TAB | Refills: 1 | Status: SHIPPED | OUTPATIENT
Start: 2018-05-07 | End: 2019-04-20 | Stop reason: SDUPTHER

## 2018-05-07 NOTE — PROGRESS NOTES
INR stable and in goal range  Patient was told to continue current medication and recheck 1 month  Please update flowsheet

## 2018-05-07 NOTE — PATIENT INSTRUCTIONS
Continue current medications    Work on diet and exercise    Lab work today    Nurse visit in 1 month for coumadin test    Keep planned follow up visit 3 month

## 2018-05-07 NOTE — PROGRESS NOTES
Chitra Whatley is a 80 y.o. male who presents to the office today with the following:  Chief Complaint   Patient presents with    Irregular Heart Beat     3 mo f/u; chronic a-fib; PT/INR; hx hyperlipidemia       Allergies   Allergen Reactions    Niaspan [Niacin] Other (comments)    Statins-Hmg-Coa Reductase Inhibitors Other (comments)     Body aches    Sulfa (Sulfonamide Antibiotics) Other (comments)     Patient doesn't remember the rxn       Current Outpatient Prescriptions   Medication Sig    warfarin (COUMADIN) 5 mg tablet take 1- 1 1/2 tablets by mouth once daily or as directed  Indications: pt taking 5mg daily    furosemide (LASIX) 20 mg tablet Take 1 Tab by mouth daily.  atenolol (TENORMIN) 25 mg tablet TAKE 1 TABLET BY MOUTH EVERY DAY    prednisoLONE sodium phosphate (INFLAMASE FORTE) 1 % ophthalmic solution     OXYGEN-AIR DELIVERY SYSTEMS 2 L by Nasal route. 2 liters o2     No current facility-administered medications for this visit. Past Medical History:   Diagnosis Date    Atrial fibrillation (HCC)     Chronic airway obstruction, not elsewhere classified     Chronic anticoagulation     Edema     Hypercholesterolemia     Hypertension     Prostate CA [facility])        Past Surgical History:   Procedure Laterality Date    HX PROSTATECTOMY  2008       History   Smoking Status    Former Smoker    Types: Cigarettes    Quit date: 9/24/1990   Smokeless Tobacco    Never Used       Family History   Problem Relation Age of Onset    Heart Disease Father     Alcohol abuse Sister          History of Present Illness:  Patient here for ongoing medical follow-up  All instructions were verbally explained to patient carefully as he does not read or write      Patient does have chronic atrial fibrillation on Coumadin. Compliant with his Coumadin currently 5 mg daily. INR stable and in range today. Will recheck 1 month. He has no cardiac complaints.   He was seen by cardiology initially with a diagnosis of atrial fibrillation. He was told he did not need to follow-up with them. Most recent CBC normal    Patient does have hypertension. Has been in good control on his current regiment. Systolic blood pressure up slightly today. He has no cardiac complaints. Most recent base metabolic profile is normal August 2017. Repeat labs ordered    He does have hyperlipidemia. He has been intolerant to statins and Niaspan. He is not interested in testing or treatment for his lipids at this point. He is aware of the indications    He does have a history of COPD. He no longer smokes. He is on chronic oxygen therapy although he does not wear his oxygen while in the office. O2 sats are normal.  He has no respiratory complaints. He is no longer on any inhalers. He does not want to restart any inhalers. No respiratory complaints today. He states he uses his oxygen when in the car at that home. O2 sat normal on room air in my office today    He does have a history of edema. On Lasix. No edema today. Recent BMP normal    He has a remote history of prostate cancer with surgery about 9 years ago. No prostate complaints. Most recent PSA essentially 0 in August 2017    Patient was seen last year for some rectal bleeding. Skin was macerated at that time. He did respond to Proctofoam.  No further rectal bleeding. We again discussed screening colon exam for polyps cancers etc.  He continues to decline    His past medical history is otherwise negative    He has had the Zostavax and  both pneumonia shots.   He was given a flu shot 8/17          Review of Systems:    Review of systems negative except as noted above      Physical Exam:  Visit Vitals    /74 (BP 1 Location: Left arm, BP Patient Position: Sitting)    Pulse 67    Temp 97.6 °F (36.4 °C) (Oral)    Resp 18    Ht 6' 4\" (1.93 m)    Wt 199 lb (90.3 kg)    SpO2 96%    BMI 24.22 kg/m2     Vitals:    05/07/18 1016   BP: 151/74   BP 1 Location: Left arm   BP Patient Position: Sitting   Pulse: 67   Resp: 18   Temp: 97.6 °F (36.4 °C)   TempSrc: Oral   SpO2: 96%   Weight: 199 lb (90.3 kg)   Height: 6' 4\" (1.93 m)   Patient no acute distress vital signs as above on repeat. O2 sat normal room air in my office  Head is normocephalic  External ears normal.  Ear canals normal TMs were partially obscured with cerumen. Visualized portions clear. Hearing was normal  Eyes PERRLA. EOMs full. Sclera clear. Patient sees his eye doctor regularly. He is on drops from his eye doctor. His vision he states is not the best but his eye doctor states there was not much he could do about it. 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 Nose within normal limits. Nares  normal.  No significant congestion  OP mucosa normal, no obvious lesions. Pharynx normal.  No erythema or exudate. Structures midline. Dentures in place  Neck no nodes no masses no bruits  Chest had distant breath sounds with rare isolated wheeze no rhonchi or rales. Good air exchange  Cor atrial fibrillation good rate control and rhythm no S3-S4 no murmurs  Abdomen  soft and was nontender  Extremities no edema. Nontender. Good range of motion  Gait and gross neurologic exam were normal    1. Chronic atrial fibrillation (HCC)  Stable on current dose of Coumadin. Patient will continue his current medication. He will be back in 1 month for nurse visit for PT/INR  - AMB POC PT/INR  - CBC WITH AUTOMATED DIFF  - warfarin (COUMADIN) 5 mg tablet; take 1- 1 1/2 tablets by mouth once daily or as directed  Indications: pt taking 5mg daily  Dispense: 135 Tab; Refill: 1  - [place] HANDLG&/OR CONVEY OF SPEC FOR TR OFFICE TO LAB  - COLLECTION VENOUS BLOOD,VENIPUNCTURE    2. Chronic anticoagulation  INR 2.3 today    3. Essential hypertension  Historically good control. Systolic blood pressure up slightly today. We will continue current medication.   I am going to see him back in 3 months and would consider adjusting his medication if his blood pressure remains elevated at that time  - METABOLIC PANEL, BASIC  - atenolol (TENORMIN) 25 mg tablet; TAKE 1 TABLET BY MOUTH EVERY DAY  Dispense: 90 Tab; Refill: 3    4. Chronic obstructive pulmonary disease with acute lower respiratory infection (HCC)  Asymptomatic. On oxygen therapy    5. History of prostate cancer  Normal PSA August 2017    6. Edema, unspecified type  No edema today  - furosemide (LASIX) 20 mg tablet; Take 1 Tab by mouth daily. Dispense: 90 Tab; Refill: 3    Patient Instructions   Continue current medications    Work on diet and exercise    Lab work today    Nurse visit in 1 month for coumadin test    Keep planned follow up visit 3 month          Continue current therapy plan except for indicated above. Verbal and written instructions (see AVS) provided.  Patient expresses understanding of diagnosis and treatment plan. Follow-up Disposition:  Return in about 3 months (around 8/7/2018). Kain Bay MD

## 2018-05-08 LAB
BASOPHILS # BLD AUTO: 0 X10E3/UL (ref 0–0.2)
BASOPHILS NFR BLD AUTO: 1 %
BUN SERPL-MCNC: 12 MG/DL (ref 8–27)
BUN/CREAT SERPL: 15 (ref 10–24)
CALCIUM SERPL-MCNC: 9 MG/DL (ref 8.6–10.2)
CHLORIDE SERPL-SCNC: 100 MMOL/L (ref 96–106)
CO2 SERPL-SCNC: 26 MMOL/L (ref 18–29)
CREAT SERPL-MCNC: 0.78 MG/DL (ref 0.76–1.27)
EOSINOPHIL # BLD AUTO: 0.1 X10E3/UL (ref 0–0.4)
EOSINOPHIL NFR BLD AUTO: 3 %
ERYTHROCYTE [DISTWIDTH] IN BLOOD BY AUTOMATED COUNT: 14.1 % (ref 12.3–15.4)
GFR SERPLBLD CREATININE-BSD FMLA CKD-EPI: 83 ML/MIN/1.73
GFR SERPLBLD CREATININE-BSD FMLA CKD-EPI: 96 ML/MIN/1.73
GLUCOSE SERPL-MCNC: 99 MG/DL (ref 65–99)
HCT VFR BLD AUTO: 36.3 % (ref 37.5–51)
HGB BLD-MCNC: 12.4 G/DL (ref 13–17.7)
IMM GRANULOCYTES # BLD: 0 X10E3/UL (ref 0–0.1)
IMM GRANULOCYTES NFR BLD: 0 %
LYMPHOCYTES # BLD AUTO: 0.9 X10E3/UL (ref 0.7–3.1)
LYMPHOCYTES NFR BLD AUTO: 21 %
MCH RBC QN AUTO: 30.5 PG (ref 26.6–33)
MCHC RBC AUTO-ENTMCNC: 34.2 G/DL (ref 31.5–35.7)
MCV RBC AUTO: 89 FL (ref 79–97)
MONOCYTES # BLD AUTO: 0.5 X10E3/UL (ref 0.1–0.9)
MONOCYTES NFR BLD AUTO: 12 %
NEUTROPHILS # BLD AUTO: 2.7 X10E3/UL (ref 1.4–7)
NEUTROPHILS NFR BLD AUTO: 63 %
PLATELET # BLD AUTO: 169 X10E3/UL (ref 150–379)
POTASSIUM SERPL-SCNC: 4.3 MMOL/L (ref 3.5–5.2)
RBC # BLD AUTO: 4.07 X10E6/UL (ref 4.14–5.8)
SODIUM SERPL-SCNC: 140 MMOL/L (ref 134–144)
WBC # BLD AUTO: 4.3 X10E3/UL (ref 3.4–10.8)

## 2018-05-08 NOTE — PROGRESS NOTES
Chemistry panel normal  CBC normal except for a very mild anemia a bit worse than last year but stable from 2016  Recommend patient start multivitamin with iron daily over-the-counter     I would like him to stop by the office for urinalysis and a Hemoccult fit test to make sure he is not losing any blood through his colon or urine  He did declined colonoscopy or Hemoccult testing of his last visit  I did speak with patient today on the phone and he agreed to come in for this additional testing

## 2018-05-10 ENCOUNTER — CLINICAL SUPPORT (OUTPATIENT)
Dept: FAMILY MEDICINE CLINIC | Age: 83
End: 2018-05-10

## 2018-05-10 DIAGNOSIS — D64.9 ANEMIA, UNSPECIFIED TYPE: ICD-10-CM

## 2018-05-11 LAB
APPEARANCE UR: CLEAR
BILIRUB UR QL STRIP: NEGATIVE
COLOR UR: YELLOW
GLUCOSE UR QL: NEGATIVE
HGB UR QL STRIP: NEGATIVE
KETONES UR QL STRIP: NEGATIVE
LEUKOCYTE ESTERASE UR QL STRIP: NEGATIVE
MICRO URNS: NORMAL
NITRITE UR QL STRIP: NEGATIVE
PH UR STRIP: 6.5 [PH] (ref 5–7.5)
PROT UR QL STRIP: NEGATIVE
SP GR UR: 1.01 (ref 1–1.03)
UROBILINOGEN UR STRIP-MCNC: 0.2 MG/DL (ref 0.2–1)

## 2018-05-14 LAB — HEMOCCULT STL QL IA: NEGATIVE

## 2018-05-30 ENCOUNTER — CLINICAL SUPPORT (OUTPATIENT)
Dept: FAMILY MEDICINE CLINIC | Age: 83
End: 2018-05-30

## 2018-05-30 ENCOUNTER — TELEPHONE (OUTPATIENT)
Dept: FAMILY MEDICINE CLINIC | Age: 83
End: 2018-05-30

## 2018-05-30 VITALS — SYSTOLIC BLOOD PRESSURE: 125 MMHG | HEART RATE: 68 BPM | DIASTOLIC BLOOD PRESSURE: 65 MMHG

## 2018-05-30 DIAGNOSIS — I48.20 CHRONIC ATRIAL FIBRILLATION (HCC): Primary | ICD-10-CM

## 2018-05-30 DIAGNOSIS — R60.9 EDEMA, UNSPECIFIED TYPE: ICD-10-CM

## 2018-05-30 LAB
INR BLD: 3.2
PT POC: 38.5 SECONDS
VALID INTERNAL CONTROL?: YES

## 2018-05-30 RX ORDER — FUROSEMIDE 20 MG/1
TABLET ORAL
Qty: 90 TAB | Refills: 3
Start: 2018-05-30 | End: 2018-06-28

## 2018-05-30 NOTE — TELEPHONE ENCOUNTER
I have called and talked to this patient, advised him of Dr Marycarmen Neal response, patient verbalizes understanding.

## 2018-05-30 NOTE — TELEPHONE ENCOUNTER
I have called and talked to this patient, advised that Dr. Maryann Wu will decrease his Lasix to 10 mg daily. He will need to make apt in 1 month to have a follow up after the medication change. I have transferred this call to the PSR's to make this apt. Patient would like to have an RX sent to his pharmacy, he says that it is too hard to cut the pill in half.

## 2018-05-30 NOTE — TELEPHONE ENCOUNTER
Unfortunately the smallest pill of Lasix is the 20 mg tablet  10 mg tablets do not exist  The patient should be able to purchase a pill cutter that would help

## 2018-06-08 ENCOUNTER — CLINICAL SUPPORT (OUTPATIENT)
Dept: FAMILY MEDICINE CLINIC | Age: 83
End: 2018-06-08

## 2018-06-08 VITALS — HEART RATE: 59 BPM | DIASTOLIC BLOOD PRESSURE: 62 MMHG | SYSTOLIC BLOOD PRESSURE: 116 MMHG

## 2018-06-08 DIAGNOSIS — I48.20 CHRONIC ATRIAL FIBRILLATION (HCC): Primary | ICD-10-CM

## 2018-06-08 LAB
INR BLD: 2.8
PT POC: 33.1 SECONDS
VALID INTERNAL CONTROL?: YES

## 2018-06-08 NOTE — PROGRESS NOTES
I have called and talked to the patient, advised of results per Dr Kaplan Draft review. Patient verbalizes understanding.

## 2018-06-25 ENCOUNTER — CLINICAL SUPPORT (OUTPATIENT)
Dept: FAMILY MEDICINE CLINIC | Age: 83
End: 2018-06-25

## 2018-06-25 DIAGNOSIS — I48.20 CHRONIC ATRIAL FIBRILLATION (HCC): Primary | ICD-10-CM

## 2018-06-25 LAB
INR BLD: 2.4
PT POC: 29.1 SECONDS
VALID INTERNAL CONTROL?: YES

## 2018-06-28 ENCOUNTER — OFFICE VISIT (OUTPATIENT)
Dept: FAMILY MEDICINE CLINIC | Age: 83
End: 2018-06-28

## 2018-06-28 VITALS
RESPIRATION RATE: 16 BRPM | TEMPERATURE: 95.9 F | WEIGHT: 192.4 LBS | DIASTOLIC BLOOD PRESSURE: 80 MMHG | BODY MASS INDEX: 23.43 KG/M2 | HEIGHT: 76 IN | HEART RATE: 59 BPM | OXYGEN SATURATION: 97 % | SYSTOLIC BLOOD PRESSURE: 155 MMHG

## 2018-06-28 DIAGNOSIS — D64.9 ANEMIA, UNSPECIFIED TYPE: ICD-10-CM

## 2018-06-28 DIAGNOSIS — I48.20 CHRONIC ATRIAL FIBRILLATION (HCC): ICD-10-CM

## 2018-06-28 DIAGNOSIS — R60.9 EDEMA, UNSPECIFIED TYPE: ICD-10-CM

## 2018-06-28 DIAGNOSIS — I10 ESSENTIAL HYPERTENSION: Primary | ICD-10-CM

## 2018-06-28 RX ORDER — HYDROCHLOROTHIAZIDE 12.5 MG/1
12.5 TABLET ORAL DAILY
Qty: 30 TAB | Refills: 2 | Status: SHIPPED | OUTPATIENT
Start: 2018-06-28 | End: 2018-09-25 | Stop reason: SDUPTHER

## 2018-06-28 NOTE — PROGRESS NOTES
Destiny García is a 80 y.o. male who presents to the office today with the following:  Chief Complaint   Patient presents with    Follow-up     Patient stopped his fluid pill       Allergies   Allergen Reactions    Niaspan [Niacin] Other (comments)    Statins-Hmg-Coa Reductase Inhibitors Other (comments)     Body aches    Sulfa (Sulfonamide Antibiotics) Other (comments)     Patient doesn't remember the rxn       Current Outpatient Prescriptions   Medication Sig    hydroCHLOROthiazide (HYDRODIURIL) 12.5 mg tablet Take 1 Tab by mouth daily.  multivitamins-iron, hematinic, tab 1 daily    warfarin (COUMADIN) 5 mg tablet take 1- 1 1/2 tablets by mouth once daily or as directed  Indications: pt taking 5mg daily    atenolol (TENORMIN) 25 mg tablet TAKE 1 TABLET BY MOUTH EVERY DAY    prednisoLONE sodium phosphate (INFLAMASE FORTE) 1 % ophthalmic solution     OXYGEN-AIR DELIVERY SYSTEMS 2 L by Nasal route. 2 liters o2     No current facility-administered medications for this visit. Past Medical History:   Diagnosis Date    Atrial fibrillation (HCC)     Chronic airway obstruction, not elsewhere classified     Chronic anticoagulation     Edema     Hypercholesterolemia     Hypertension     Prostate CA [facility])        Past Surgical History:   Procedure Laterality Date    HX PROSTATECTOMY  2008       History   Smoking Status    Former Smoker    Types: Cigarettes    Quit date: 9/24/1990   Smokeless Tobacco    Never Used       Family History   Problem Relation Age of Onset    Heart Disease Father     Alcohol abuse Sister          History of Present Illness:  Patient here for follow-up blood pressure and edema    Patient was seen 5/7 for routine medical checkup. Please refer to that note for review of all of his medical issues    He was taking furosemide 20 mg daily both for hypertension as well as a history of lower extremity edema.   He called after the visit to state the Lasix was making him urinate too much and wanted to decrease the dose to 10 mg daily. There is no 10 mg Lasix tablet available. Patient decided to stop his furosemide. Off the furosemide he is no longer urinating too much. He has had no recurrence of lower extremity edema. Note shortness of breath PND or orthopnea. His blood pressures which were a bit borderline at the last visit are higher today. He would be willing to augment his regimen. Recent base metabolic profile normal.  We are adding in low-dose Hydrocort Dyazide    Patient does have atrial fibrillation. Was just an earlier in the week for his PT/INR which is stable. He is due to come back in 2 weeks for recheck. He has no cardiac complaints    Recent CBC shows a very mild anemia 36.3. I did recommend multivitamin with iron which she is taking. Urinalysis was negative for blood. Hemoccult fit test negative for blood. Plan to check repeat CBC when I see him back later on the summer      Review of Systems:      Review of systems negative except as noted above    Physical Exam:  Visit Vitals    /80    Pulse (!) 59    Temp 95.9 °F (35.5 °C) (Oral)    Resp 16    Ht 6' 4\" (1.93 m)    Wt 192 lb 6.4 oz (87.3 kg)    SpO2 97%    BMI 23.42 kg/m2     Vitals:    06/28/18 0954 06/28/18 1051   BP: 146/60 155/80   BP 1 Location: Left arm    BP Patient Position: Sitting    Pulse: (!) 59    Resp: 16    Temp: 95.9 °F (35.5 °C)    TempSrc: Oral    SpO2: 97%    Weight: 192 lb 6.4 oz (87.3 kg)    Height: 6' 4\" (1.93 m)      Patient no acute distress. Vital signs as above on my check bilaterally  Head was normocephalic  Chest was clear no wheezes rhonchi or rales  Cor atrial fibrillation good rate control no S3 no S4 no murmurs  Extremities had no edema    Assessment/Plan:  1. Essential hypertension  Patient with hypertension not optimally controlled off the furosemide.   Given try to add in low-dose hydrochlorothiazide both for his history of edema and blood pressure control. He is scheduled to see me in August for follow-up. He will notify me if he has any urinary issues with HCTZ  - hydroCHLOROthiazide (HYDRODIURIL) 12.5 mg tablet; Take 1 Tab by mouth daily. Dispense: 30 Tab; Refill: 2    2. Anemia, unspecified type  We will continue multivitamin with iron and recheck labs at follow-up visit    3. Edema, unspecified type  No edema today. I am starting HCTZ    4. Chronic atrial fibrillation (HCC)  In good control recent PT/INR in range          Continue current therapy plan except for indicated above. Verbal and written instructions (see AVS) provided.  Patient expresses understanding of diagnosis and treatment plan. Follow-up Disposition: Not on [address] [place] [address].  Umesh Montiel MD

## 2018-06-28 NOTE — PROGRESS NOTES
Chief Complaint   Patient presents with    Follow-up     Patient stopped his fluid pill     Visit Vitals    /60 (BP 1 Location: Left arm, BP Patient Position: Sitting)    Pulse (!) 59    Temp 95.9 °F (35.5 °C) (Oral)    Resp 16    Ht 6' 4\" (1.93 m)    Wt 192 lb 6.4 oz (87.3 kg)    SpO2 97%    BMI 23.42 kg/m2     1. Have you been to the ER, urgent care clinic since your last visit? Hospitalized since your last visit? No    2. Have you seen or consulted any other health care providers outside of the [address] [place] [address] since your last visit? Include any pap smears or colon screening.  No   Zbigniew Soto LPN

## 2018-06-28 NOTE — MR AVS SNAPSHOT
[place] [address] 
[phone] Patient: Destiny García MRN: [id number] Visit Information Date & Time Provider Department Dept. Phone Encounter #  
 6/28/2018 10:00 AM Shanon Sen  [facility] [phone] [id number] Your Appointments 8/7/2018  9:30 AM  
Any with Shanon Sen MD  
[facility] Appt Note: 3 mo fu coumadin $0 CP mbm  
 [phone]  
[phone]  
  
   
 [address] [address] Upcoming Health Maintenance Date Due  
 MEDICARE YEARLY EXAM 3/14/2018 Influenza Age 5 to Adult 8/1/2018 GLAUCOMA SCREENING Q2Y 5/11/2019 DTaP/Tdap/Td series (2 - Td) 7/10/2027 Allergies as of 6/28/2018  Review Complete On: 6/28/2018 By: Shanon Sen MD  
  
 Severity Noted Reaction Type Reactions Niaspan [Niacin]  12/03/2013    Other (comments) Statins-hmg-coa Reductase Inhibitors  09/24/2013   Side Effect Other (comments) Body aches Sulfa (Sulfonamide Antibiotics)  09/24/2013    Other (comments) Patient doesn't remember the rxn Current Immunizations  Reviewed on 1/29/2018 Name Date Influenza High Dose Vaccine PF 1/29/2018, 8/30/2017, 10/19/2016, 10/5/2015 Influenza Vaccine 9/24/2014 Pneumococcal Conjugate (PCV-13) 2/9/2017 Pneumococcal Polysaccharide (PPSV-23) 10/5/2015, 9/19/2014 Not reviewed this visit You Were Diagnosed With   
  
 Codes Comments Essential hypertension    -  Primary ICD-10-CM: I10 
ICD-9-CM: 401.9 Anemia, unspecified type     ICD-10-CM: D64.9 ICD-9-CM: 285.9 Edema, unspecified type     ICD-10-CM: R60.9 ICD-9-CM: 909. 3 Chronic atrial fibrillation (HCC)     ICD-10-CM: M23.0 ICD-9-CM: 427.31 Vitals BP Pulse Temp Resp Height(growth percentile) Weight(growth percentile) 146/60 (BP 1 Location: Left arm, BP Patient Position: Sitting) (!) 59 95.9 °F (35.5 °C) (Oral) 16 6' 4\" (1.93 m) 192 lb 6.4 oz (87.3 kg) SpO2 BMI Smoking Status 97% 23.42 kg/m2 Former Smoker Vitals History BMI and BSA Data Body Mass Index Body Surface Area  
 23.42 kg/m 2 2.16 m 2 Preferred Pharmacy Pharmacy Name Phone [facility], [address] [place] [address] Your Updated Medication List  
  
   
This list is accurate as of 6/28/18 10:41 AM.  Always use your most recent med list.  
  
  
  
  
 atenolol 25 mg tablet Commonly known as:  TENORMIN  
TAKE 1 TABLET BY MOUTH EVERY DAY  
  
 hydroCHLOROthiazide 12.5 mg tablet Commonly known as:  HYDRODIURIL Take 1 Tab by mouth daily. multivitamins-iron (hematinic) Tab  
1 daily OXYGEN-AIR DELIVERY SYSTEMS  
2 L by Nasal route. 2 liters o2  
  
 prednisoLONE sodium phosphate 1 % ophthalmic solution Commonly known as:  INFLAMASE FORTE  
  
 warfarin 5 mg tablet Commonly known as:  COUMADIN  
take 1- 1 1/2 tablets by mouth once daily or as directed  Indications: pt taking 5mg daily Prescriptions Sent to Pharmacy Refills  
 hydroCHLOROthiazide (HYDRODIURIL) 12.5 mg tablet 2 Sig: Take 1 Tab by mouth daily. Class: Normal  
 Pharmacy: [facility] #: [phone] Route: Oral  
  
Patient Instructions Continue current medications Stop furosemide Start HCTZ one daily Call if this affects your urine Work on diet and exercise Keep planned follow up visit August 
 
 
  
Introducing [facility]! [employer] introduces [employer] patient portal. Now you can access parts of your medical record, email your doctor's office, and request medication refills online.    
 
1. In your internet browser, go to [email or web address] 2. Click on the First Time User? Click Here link in the Sign In box. You will see the New Member Sign Up page. 3. Enter your [email or web address] Access Code exactly as it appears below. You will not need to use this code after youve completed the sign-up process. If you do not sign up before the expiration date, you must request a new code. · [email or web address] Access Code: [facility] Expires: 9/26/2018  9:58 AM 
 
4. Enter the last four digits of your Social Security Number (xxxx) and Date of Birth (mm/dd/yyyy) as indicated and click Submit. You will be taken to the next sign-up page. 5. Create a [email or web address] ID. This will be your [employer] login ID and cannot be changed, so think of one that is secure and easy to remember. 6. Create a [employer] password. You can change your password at any time. 7. Enter your Password Reset Question and Answer. This can be used at a later time if you forget your password. 8. Enter your e-mail address. You will receive e-mail notification when new information is available in [address] 9. Click Sign Up. You can now view and download portions of your medical record. 10. Click the Download Summary menu link to download a portable copy of your medical information. If you have questions, please visit the Frequently Asked Questions section of the [employer] website. Remember, [employer] is NOT to be used for urgent needs. For medical emergencies, dial 911. Now available from your iPhone and Android! Please provide this summary of care documentation to your next provider. Your primary care clinician is listed as Sagar Mejía. If you have any questions after today's visit, please call [phone].

## 2018-06-28 NOTE — PATIENT INSTRUCTIONS
Continue current medications  Stop furosemide  Start HCTZ one daily  Call if this affects your urine    Work on diet and exercise      Keep planned follow up visit August

## 2018-08-01 ENCOUNTER — CLINICAL SUPPORT (OUTPATIENT)
Dept: FAMILY MEDICINE CLINIC | Age: 83
End: 2018-08-01

## 2018-08-01 VITALS — DIASTOLIC BLOOD PRESSURE: 72 MMHG | SYSTOLIC BLOOD PRESSURE: 128 MMHG | HEART RATE: 69 BPM

## 2018-08-01 DIAGNOSIS — I48.20 CHRONIC ATRIAL FIBRILLATION (HCC): Primary | ICD-10-CM

## 2018-08-01 LAB
INR BLD: 2.5
PT POC: 29.5 SECONDS
VALID INTERNAL CONTROL?: YES

## 2018-08-21 ENCOUNTER — CLINICAL SUPPORT (OUTPATIENT)
Dept: FAMILY MEDICINE CLINIC | Age: 83
End: 2018-08-21

## 2018-08-21 VITALS — DIASTOLIC BLOOD PRESSURE: 82 MMHG | OXYGEN SATURATION: 98 % | HEART RATE: 96 BPM | SYSTOLIC BLOOD PRESSURE: 130 MMHG

## 2018-08-21 DIAGNOSIS — I48.20 CHRONIC ATRIAL FIBRILLATION (HCC): Primary | ICD-10-CM

## 2018-08-21 LAB
INR BLD: 1.9
PT POC: 23.3 SECONDS
VALID INTERNAL CONTROL?: YES

## 2018-08-21 NOTE — PROGRESS NOTES
INR acceptable on current dose of Coumadin  Patient to continue his current medications  He is due for routine follow-up  He may schedule this appointment with me in 3-4 weeks for routine medical follow-up and we will check his PT/INR again at that time

## 2018-09-26 PROBLEM — E53.8 VITAMIN B12 DEFICIENCY: Status: ACTIVE | Noted: 2018-09-26

## 2018-10-18 NOTE — PROGRESS NOTES
Chief Complaint   Patient presents with    Irregular Heart Beat     3 mo f/u; chronic a-fib; PT/INR; hx hyperlipidemia     Health Maintenance Due   Topic Date Due    MEDICARE YEARLY EXAM  03/14/2018     Visit Vitals    /74 (BP 1 Location: Left arm, BP Patient Position: Sitting)    Pulse 67    Temp 97.6 °F (36.4 °C) (Oral)    Resp 18    Ht 6' 4\" (1.93 m)    Wt 199 lb (90.3 kg)    SpO2 96%    BMI 24.22 kg/m2     Raj Garza LPN 19-Oct-2018 18:42

## 2021-08-03 PROBLEM — I10 HYPERTENSION: Status: RESOLVED | Noted: 2021-08-03 | Resolved: 2021-08-03

## 2021-08-03 PROBLEM — J44.9 COPD (CHRONIC OBSTRUCTIVE PULMONARY DISEASE) (HCC): Status: RESOLVED | Noted: 2021-08-03 | Resolved: 2021-08-03

## 2023-02-25 NOTE — PROGRESS NOTES
From: Chrissie Evans  To: Nilo Mon  Sent: 2/25/2023 9:44 AM CST  Subject: Results from stool sample    Please let me know my results that I brought in on the 24   Patient notified patient

## 2025-04-22 NOTE — ACP (ADVANCE CARE PLANNING)
Ad[employer] Care Planning (ACP) Provider Conversation Snapshot    Date of ACP Conversation: 02/09/17  Persons included in Conversation:  patient    Pt has ACP on file, nothing has changed
no